# Patient Record
Sex: FEMALE | Race: BLACK OR AFRICAN AMERICAN | NOT HISPANIC OR LATINO | URBAN - METROPOLITAN AREA
[De-identification: names, ages, dates, MRNs, and addresses within clinical notes are randomized per-mention and may not be internally consistent; named-entity substitution may affect disease eponyms.]

---

## 2017-12-14 ENCOUNTER — INPATIENT (INPATIENT)
Facility: HOSPITAL | Age: 52
LOS: 5 days | Discharge: HOME | DRG: 885 | End: 2017-12-20
Attending: STUDENT IN AN ORGANIZED HEALTH CARE EDUCATION/TRAINING PROGRAM | Admitting: STUDENT IN AN ORGANIZED HEALTH CARE EDUCATION/TRAINING PROGRAM
Payer: MEDICAID

## 2017-12-14 RX ORDER — METFORMIN HYDROCHLORIDE 850 MG/1
500 TABLET ORAL
Qty: 0 | Refills: 0 | Status: DISCONTINUED | OUTPATIENT
Start: 2017-12-14 | End: 2017-12-20

## 2017-12-14 RX ORDER — HALOPERIDOL DECANOATE 100 MG/ML
5 INJECTION INTRAMUSCULAR EVERY 6 HOURS
Qty: 0 | Refills: 0 | Status: DISCONTINUED | OUTPATIENT
Start: 2017-12-14 | End: 2017-12-20

## 2017-12-14 RX ORDER — ESCITALOPRAM OXALATE 10 MG/1
10 TABLET, FILM COATED ORAL DAILY
Qty: 0 | Refills: 0 | Status: DISCONTINUED | OUTPATIENT
Start: 2017-12-14 | End: 2017-12-20

## 2017-12-14 RX ORDER — DIPHENHYDRAMINE HCL 50 MG
50 CAPSULE ORAL EVERY 6 HOURS
Qty: 0 | Refills: 0 | Status: DISCONTINUED | OUTPATIENT
Start: 2017-12-14 | End: 2017-12-20

## 2017-12-14 RX ORDER — HYDROCHLOROTHIAZIDE 25 MG
12.5 TABLET ORAL DAILY
Qty: 0 | Refills: 0 | Status: DISCONTINUED | OUTPATIENT
Start: 2017-12-14 | End: 2017-12-20

## 2017-12-14 NOTE — ED BEHAVIORAL HEALTH ASSESSMENT NOTE - SUMMARY
15 year old girl with no clear past psychiatric diagnosis and history of non-suicidal self-injury brought in by mother to [facility] ER for “panic attacks” of increasing severity and frequency which started around 1 month ago. Patient is currently mildly depressed, not manic/psychotic/suicidal/homicidal. Based on her account, the NSSI behavior is the result of her incapacity to regulate her emotions at times of intense stress and not secondary to suicidal ideation/intent.  Patient is currently not an imminent threat to self/others and does not meet the criteria for inpatient psychiatric admission.  She warrants a diagnosis of panic disorder and possibly adjustment disorder with depressed mood.  She will be given outpatient psychiatric referrals for followup. Mother was advised to return to ER if patient develops suicidal thoughts, becomes severely depressed or engages in self-injurious behavior with unclear intent.

## 2017-12-14 NOTE — ED BEHAVIORAL HEALTH ASSESSMENT NOTE - OTHER PAST PSYCHIATRIC HISTORY (INCLUDE DETAILS REGARDING ONSET, COURSE OF ILLNESS, INPATIENT/OUTPATIENT TREATMENT)
Patient used to see a therapist on a regular basis for around 1 year until about a year ago. She started seeing the therapist after her mother discovered her NSSI behavior. It is unclear why therapy was discontinued. Details of therapist not clearly known (a “ at [facility]”).No history of being evaluated/treated by a psychiatrist or psychiatric hospitalization.

## 2017-12-14 NOTE — ED BEHAVIORAL HEALTH ASSESSMENT NOTE - HPI (INCLUDE ILLNESS QUALITY, SEVERITY, DURATION, TIMING, CONTEXT, MODIFYING FACTORS, ASSOCIATED SIGNS AND SYMPTOMS)
15 year old girl with no clear past psychiatric diagnosis and history of non-suicidal self-injury brought in by mother to [facility] ER for “panic attacks”. Writer evaluated patient and spoke with mother.     Patient has been having episodes of discomfort lasting 10-15mins during which she becomes sweaty, breathless  and severely anxious with the feeling that she might die on some occasions.She had approximately 4 episodes today and 3 yesterday. These attacks were first noticed around 1 month ago. Per patient and mother, they have increased in terms of frequency and severity over the last few weeks.Mother also reported that she has social anxiety to a degree and fear of public speaking.During such instances she has experienced panic symptoms in the past.Some of her panic attacks were witnessed by sister/mother.    Patient also endorsed that her mood is depressed but not to the point of feeling suicidal. She currently does not have other neurovegetative symptoms of depression. She denied manic episodes in the past, denied auditory/visual hallucinations. No delusions verbalized during today’s encounter. No history of sexual/physical trauma reported.  Patient has a history of engaging in non-suicidal self-injury  which was first reported around 4 years ago. She tends to cut in areas that are not easily visible such as her hips/thighs. Also reported having inflicted wounds to forearm but there were no discernible scars. She reportedly cuts when she feels overwhelmed/angry without suicidal intent but as a method of relieving her frustration. Per mother, there is a learned component to this behavior as her elder sister (18y) also engages in NSSI.     As of now she denies suicidal ideation (/homicidal ideation), intent or plan. She reported having passive SI intermittently in the past (1week ago), but  that feeling has passed. She stated that she wouldn’t attempt suicide as that have an immense emotional impact on her loved ones. Other stated reasons for living include wanting to spend time with friends and “hang out”.     Patient also reported significant psychosocial stress at home (corroborated by mother) : grandmother who lives in the same house in an alcoholic  who needs to be looked after by patient, sister and mother. Patient reported feeling very upset about the fact that she has had to tend to her grandmother’s needs when she drinks, such as helping her stand up when she falls in an intoxicated state. Patient also reported considerable stress associated with the fact that she has to “play messenger” between her  parents.

## 2017-12-14 NOTE — ED BEHAVIORAL HEALTH ASSESSMENT NOTE - SUICIDE PROTECTIVE FACTORS
Fear of death or dying due to pain/suffering/Engaged in work or school/Identifies reasons for living/Future oriented/Responsibility to family and others/Supportive social network or family

## 2017-12-14 NOTE — ED BEHAVIORAL HEALTH ASSESSMENT NOTE - DESCRIPTION
Uneventful None Lives with grandmother, mother and 18y sister. Parents . Father lives in [facility]. Patient visits him at times. Grandmother has alcohol use disorder and this has put considerable stress on the entire family. Patient is in 10th grade. Per mother, she was on the honor’s roll for the last semester. Patient reports that she has at least 5 good friends with whom she enjoys outings. She is currently not in a relationship.

## 2017-12-14 NOTE — ED BEHAVIORAL HEALTH ASSESSMENT NOTE - DETAILS
Intermittent passive suicidal ideation in the past 18y old sister has a diagnosis of ADHD and depression for which she takes medications 75y old grandmother has alcohol use disorder
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
## 2017-12-15 VITALS
SYSTOLIC BLOOD PRESSURE: 119 MMHG | TEMPERATURE: 98 F | HEART RATE: 84 BPM | RESPIRATION RATE: 18 BRPM | DIASTOLIC BLOOD PRESSURE: 65 MMHG

## 2017-12-15 DIAGNOSIS — E11.9 TYPE 2 DIABETES MELLITUS WITHOUT COMPLICATIONS: ICD-10-CM

## 2017-12-15 DIAGNOSIS — F33.9 MAJOR DEPRESSIVE DISORDER, RECURRENT, UNSPECIFIED: ICD-10-CM

## 2017-12-15 DIAGNOSIS — I10 ESSENTIAL (PRIMARY) HYPERTENSION: ICD-10-CM

## 2017-12-15 PROCEDURE — 99223 1ST HOSP IP/OBS HIGH 75: CPT

## 2017-12-15 RX ORDER — RISPERIDONE 4 MG/1
0.5 TABLET ORAL
Qty: 0 | Refills: 0 | Status: DISCONTINUED | OUTPATIENT
Start: 2017-12-15 | End: 2017-12-18

## 2017-12-15 RX ORDER — INSULIN LISPRO 100/ML
VIAL (ML) SUBCUTANEOUS
Qty: 0 | Refills: 0 | Status: DISCONTINUED | OUTPATIENT
Start: 2017-12-15 | End: 2017-12-20

## 2017-12-15 RX ADMIN — RISPERIDONE 0.5 MILLIGRAM(S): 4 TABLET ORAL at 21:44

## 2017-12-15 RX ADMIN — METFORMIN HYDROCHLORIDE 500 MILLIGRAM(S): 850 TABLET ORAL at 08:06

## 2017-12-15 RX ADMIN — METFORMIN HYDROCHLORIDE 500 MILLIGRAM(S): 850 TABLET ORAL at 17:27

## 2017-12-15 RX ADMIN — ESCITALOPRAM OXALATE 10 MILLIGRAM(S): 10 TABLET, FILM COATED ORAL at 11:49

## 2017-12-15 RX ADMIN — Medication 12.5 MILLIGRAM(S): at 11:49

## 2017-12-15 NOTE — BEHAVIORAL HEALTH ASSESSMENT NOTE - NSBHCHARTREVIEWVS_PSY_A_CORE FT
Vital Signs Last 24 Hrs  T(C): 36.8 (15 Dec 2017 09:00), Max: 36.9 (15 Dec 2017 06:26)  T(F): 98.3 (15 Dec 2017 09:00), Max: 98.5 (15 Dec 2017 06:26)  HR: 101 (15 Dec 2017 09:00) (84 - 101)  BP: 140/86 (15 Dec 2017 09:00) (119/65 - 140/86)  BP(mean): --  RR: 18 (15 Dec 2017 09:00) (18 - 18)  SpO2: --

## 2017-12-15 NOTE — BEHAVIORAL HEALTH ASSESSMENT NOTE - AXIS III
NIDDM, Hypertension, hx of fibroid, hx of hysterectomy 2011 NIDDM, Hypertension, hx of fibroid, hx of hysterectomy 2011, cholecystectomy

## 2017-12-15 NOTE — BEHAVIORAL HEALTH ASSESSMENT NOTE - NSBHMEDSOTHERFT_PSY_A_CORE
Metformin 500mg BID with meals  HCTZ 12.5mg daily  Lexapro 10mg daily -started at [facility]

## 2017-12-15 NOTE — BEHAVIORAL HEALTH ASSESSMENT NOTE - PROBLEM SELECTOR PLAN 1
Continue Lexapro 10mg  Begin low dose Risperdal 0.5mg BID  labs ordered for 12/16 am: CBC w diff, CMP, a1c, lipid panel, thyroid panel, folate serum, vitamin b12, vitamin d  I/G/M therapy

## 2017-12-15 NOTE — BEHAVIORAL HEALTH ASSESSMENT NOTE - HPI (INCLUDE ILLNESS QUALITY, SEVERITY, DURATION, TIMING, CONTEXT, MODIFYING FACTORS, ASSOCIATED SIGNS AND SYMPTOMS)
This is a 52 year old [place] American , unemployed female receiving public assistance. She currently resides with her adult daughter and 10 month old grandson in [place]. Patient denies formal psychiatric history, but has 2 prior suicide attempts via poisoning and OD. She has never been formally diagnosed, has no prior inpatient hospitalizations and has never seen a psychiatrist or therapist. Patient was transferred from [facility] to [facility]. She presented via EMS after they were activated by her PCP at [facility] after she endorsed depression and SI during a routine screening. Patient reports receiving PO PRN medications after ‘ I spazed on the doctor who was lying to me at [facility]’ after being told several days after being in the [facility] that she was going to be admitted to a psychiatric unit.  Patient states that she was at a routine visit to do lab work regarding her DM on Monday, but when asked about her depression she endorsed feeling depressed and suicidal which she reports usually feeling. She states that she was shocked when the police were alerted and she was forced to go to the hospital.   Patient reports that she usually feels depressed and suicidal, but finds that it worsens during September to December. September is the anniversary of her mother’s death in 2003. She reports long standing history of depression as a child after discovering that out of her 7 siblings she was the only would who had a different father. She endorses guilt feeling as though she ‘ruined’ her parents marriage. She recalls as a child frequently making suicidal statements due to looking and feeling different from her siblings and first attempted suicide at age 10/11 after drinking poison. She later attempted suicide as a young adult via OD on medications. She states that she currently feels like a burden to her family, “I feel less than.” She cites that she is now on public assistance after pursuing her bachelors at [employer] she was unable to move forward with getting her masters for Social work. “You can never get ahead.”  She endorses symptoms of depression including poor sleep, stating that she does not sleep at night, only sleeps for 2 hours a day, with mini 15-20 minute naps, increase in eating of junk food and incessant crying.   She reports since childhood having auditory hallucinations hearing voices saying things such as “It would have been better if you weren’t born, things would be better for your family.” The voices have told her at times to jump in front of a car or a train. At times she also hears her mother humming certain [place] songs. She last had a/h several days ago. She reports a remote history of v/h seeing a dead figure running from her when she was a child. No paranoia. Protective factors against suicide is her daughter and grandson “I have to prep her to be more self-sufficient’   In 2011 she had several surgeries related to having her fibroids removed, subsequently having 5 bacterial infections and later a hysterectomy. She reports noticing a dramatic change in mood, becoming more confrontational, increase in anger and irritability, disorganization and confusion. She states that she stopped taking her metformin in February due to low a1c. She endorses history of impulsively buying things such as ornaments and magnets and lately has begun making them herself. She has also been cleaning more, but finding that she is becoming focused on cleaning one thing and being unable to move until it she believes that it is sufficiently clean-it never is. She reports a history of OCD like tendencies.   No drug/substance use history.

## 2017-12-15 NOTE — BEHAVIORAL HEALTH ASSESSMENT NOTE - NSBHADMITCOUNSEL_PSY_A_CORE
risks and benefits of treatment options/instructions for management, treatment and follow up/importance of adherence to chosen treatment/client/family/caregiver education

## 2017-12-15 NOTE — BEHAVIORAL HEALTH ASSESSMENT NOTE - DIFFERENTIAL
MDD severe recurrent with psychotic features  Schizoaffective disorder depressed   r/o BAD current episode depressed

## 2017-12-15 NOTE — BEHAVIORAL HEALTH ASSESSMENT NOTE - SUMMARY
This is a 52 year old [place] American , unemployed female receiving public assistance. She currently resides with her adult daughter and 10 month old grandson in [place]. Patient denies formal psychiatric history, but has 2 prior suicide attempts via poisoning and OD. She has never been formally diagnosed, has no prior inpatient hospitalizations and has never seen a psychiatrist or therapist. Patient was transferred from [facility] to [facility]. She presented via EMS after they were activated by her PCP at [facility] after she endorsed depression and SI during a routine screening. Patient reports receiving PO PRN medications after ‘ I spazed on the doctor who was lying to me at [facility]’ after being told several days after being in the [facility] that she was going to be admitted to a psychiatric unit.    Patient reports since starting medication she has not had a/h in several days.

## 2017-12-15 NOTE — BEHAVIORAL HEALTH ASSESSMENT NOTE - NSBHSUICRISKFACTOR_PSY_A_CORE
Anhedonia/Family history of suicide/Global insomnia/Mood episode/Command hallucinations to hurt self
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
## 2017-12-15 NOTE — BEHAVIORAL HEALTH ASSESSMENT NOTE - RISK ASSESSMENT
Static: Mental illness, prior suicide attempts  Modifiable: Access to treatment/medications  Protective: family, residential stability

## 2017-12-16 LAB
ALBUMIN SERPL ELPH-MCNC: 4.1 G/DL — SIGNIFICANT CHANGE UP (ref 3.3–5)
ALP SERPL-CCNC: 105 U/L — SIGNIFICANT CHANGE UP (ref 40–120)
ALT FLD-CCNC: 34 U/L — SIGNIFICANT CHANGE UP (ref 10–45)
ANION GAP SERPL CALC-SCNC: 16 MMOL/L — SIGNIFICANT CHANGE UP (ref 5–17)
AST SERPL-CCNC: 41 U/L — HIGH (ref 10–40)
BASOPHILS NFR BLD AUTO: 0.5 % — SIGNIFICANT CHANGE UP (ref 0–2)
BILIRUB SERPL-MCNC: 0.4 MG/DL — SIGNIFICANT CHANGE UP (ref 0.2–1.2)
BUN SERPL-MCNC: 12 MG/DL — SIGNIFICANT CHANGE UP (ref 7–23)
CALCIUM SERPL-MCNC: 9.4 MG/DL — SIGNIFICANT CHANGE UP (ref 8.4–10.5)
CHLORIDE SERPL-SCNC: 96 MMOL/L — SIGNIFICANT CHANGE UP (ref 96–108)
CHOLEST SERPL-MCNC: 153 MG/DL — SIGNIFICANT CHANGE UP (ref 10–199)
CO2 SERPL-SCNC: 24 MMOL/L — SIGNIFICANT CHANGE UP (ref 22–31)
CREAT SERPL-MCNC: 0.85 MG/DL — SIGNIFICANT CHANGE UP (ref 0.5–1.3)
EOSINOPHIL NFR BLD AUTO: 1.7 % — SIGNIFICANT CHANGE UP (ref 0–6)
FOLATE SERPL-MCNC: 13.4 NG/ML — SIGNIFICANT CHANGE UP (ref 4.8–24.2)
GLUCOSE SERPL-MCNC: 245 MG/DL — HIGH (ref 70–99)
HBA1C BLD-MCNC: 6.7 % — HIGH (ref 4–5.6)
HCT VFR BLD CALC: 41.3 % — SIGNIFICANT CHANGE UP (ref 34.5–45)
HDLC SERPL-MCNC: 34 MG/DL — LOW (ref 40–125)
HGB BLD-MCNC: 13.8 G/DL — SIGNIFICANT CHANGE UP (ref 11.5–15.5)
LIPID PNL WITH DIRECT LDL SERPL: 96 MG/DL — SIGNIFICANT CHANGE UP
LYMPHOCYTES # BLD AUTO: 46.9 % — HIGH (ref 13–44)
MCHC RBC-ENTMCNC: 28.2 PG — SIGNIFICANT CHANGE UP (ref 27–34)
MCHC RBC-ENTMCNC: 33.4 G/DL — SIGNIFICANT CHANGE UP (ref 32–36)
MCV RBC AUTO: 84.5 FL — SIGNIFICANT CHANGE UP (ref 80–100)
MONOCYTES NFR BLD AUTO: 5.5 % — SIGNIFICANT CHANGE UP (ref 2–14)
NEUTROPHILS NFR BLD AUTO: 45.4 % — SIGNIFICANT CHANGE UP (ref 43–77)
PLATELET # BLD AUTO: 276 K/UL — SIGNIFICANT CHANGE UP (ref 150–400)
POTASSIUM SERPL-MCNC: 3.3 MMOL/L — LOW (ref 3.5–5.3)
POTASSIUM SERPL-SCNC: 3.3 MMOL/L — LOW (ref 3.5–5.3)
PROT SERPL-MCNC: 7.9 G/DL — SIGNIFICANT CHANGE UP (ref 6–8.3)
RBC # BLD: 4.89 M/UL — SIGNIFICANT CHANGE UP (ref 3.8–5.2)
RBC # FLD: 14.2 % — SIGNIFICANT CHANGE UP (ref 10.3–16.9)
SODIUM SERPL-SCNC: 136 MMOL/L — SIGNIFICANT CHANGE UP (ref 135–145)
T3 SERPL-MCNC: 90 NG/DL — SIGNIFICANT CHANGE UP (ref 80–200)
T4 AB SER-ACNC: 7.82 UG/DL — SIGNIFICANT CHANGE UP (ref 3.17–11.72)
TOTAL CHOLESTEROL/HDL RATIO MEASUREMENT: 4.5 RATIO — SIGNIFICANT CHANGE UP (ref 3.3–7.1)
TRIGL SERPL-MCNC: 114 MG/DL — SIGNIFICANT CHANGE UP (ref 10–149)
TSH SERPL-MCNC: 1.4 UIU/ML — SIGNIFICANT CHANGE UP (ref 0.35–4.94)
VIT B12 SERPL-MCNC: 256 PG/ML — SIGNIFICANT CHANGE UP (ref 243–894)
WBC # BLD: 6.4 K/UL — SIGNIFICANT CHANGE UP (ref 3.8–10.5)
WBC # FLD AUTO: 6.4 K/UL — SIGNIFICANT CHANGE UP (ref 3.8–10.5)

## 2017-12-16 PROCEDURE — 99231 SBSQ HOSP IP/OBS SF/LOW 25: CPT

## 2017-12-16 RX ADMIN — Medication 12.5 MILLIGRAM(S): at 10:53

## 2017-12-16 RX ADMIN — METFORMIN HYDROCHLORIDE 500 MILLIGRAM(S): 850 TABLET ORAL at 10:53

## 2017-12-16 RX ADMIN — RISPERIDONE 0.5 MILLIGRAM(S): 4 TABLET ORAL at 10:53

## 2017-12-16 RX ADMIN — METFORMIN HYDROCHLORIDE 500 MILLIGRAM(S): 850 TABLET ORAL at 17:32

## 2017-12-16 RX ADMIN — ESCITALOPRAM OXALATE 10 MILLIGRAM(S): 10 TABLET, FILM COATED ORAL at 10:53

## 2017-12-16 RX ADMIN — RISPERIDONE 0.5 MILLIGRAM(S): 4 TABLET ORAL at 21:27

## 2017-12-16 NOTE — PROGRESS NOTE BEHAVIORAL HEALTH - NSBHFUPINTERVALHXFT_PSY_A_CORE
No reported beh issues or issues overnight. Pt reported feeling "better" with improved sleep, intact appetite. Reported that no longer experiencing SI/intent or plan. Denied HI/AVH. Denied physical complaints. Requested DM dietician/educator.

## 2017-12-17 LAB — 24R-OH-CALCIDIOL SERPL-MCNC: 9.4 NG/ML — LOW (ref 30–80)

## 2017-12-17 RX ADMIN — Medication 12.5 MILLIGRAM(S): at 10:02

## 2017-12-17 RX ADMIN — METFORMIN HYDROCHLORIDE 500 MILLIGRAM(S): 850 TABLET ORAL at 17:03

## 2017-12-17 RX ADMIN — RISPERIDONE 0.5 MILLIGRAM(S): 4 TABLET ORAL at 21:26

## 2017-12-17 RX ADMIN — METFORMIN HYDROCHLORIDE 500 MILLIGRAM(S): 850 TABLET ORAL at 07:06

## 2017-12-17 RX ADMIN — RISPERIDONE 0.5 MILLIGRAM(S): 4 TABLET ORAL at 10:02

## 2017-12-17 RX ADMIN — ESCITALOPRAM OXALATE 10 MILLIGRAM(S): 10 TABLET, FILM COATED ORAL at 10:02

## 2017-12-18 PROCEDURE — 99232 SBSQ HOSP IP/OBS MODERATE 35: CPT

## 2017-12-18 RX ORDER — RISPERIDONE 4 MG/1
0.5 TABLET ORAL DAILY
Qty: 0 | Refills: 0 | Status: DISCONTINUED | OUTPATIENT
Start: 2017-12-18 | End: 2017-12-19

## 2017-12-18 RX ORDER — ERGOCALCIFEROL 1.25 MG/1
50000 CAPSULE ORAL
Qty: 0 | Refills: 0 | Status: DISCONTINUED | OUTPATIENT
Start: 2017-12-18 | End: 2017-12-20

## 2017-12-18 RX ORDER — RISPERIDONE 4 MG/1
1 TABLET ORAL AT BEDTIME
Qty: 0 | Refills: 0 | Status: DISCONTINUED | OUTPATIENT
Start: 2017-12-18 | End: 2017-12-19

## 2017-12-18 RX ADMIN — ESCITALOPRAM OXALATE 10 MILLIGRAM(S): 10 TABLET, FILM COATED ORAL at 11:40

## 2017-12-18 RX ADMIN — METFORMIN HYDROCHLORIDE 500 MILLIGRAM(S): 850 TABLET ORAL at 08:00

## 2017-12-18 RX ADMIN — RISPERIDONE 0.5 MILLIGRAM(S): 4 TABLET ORAL at 11:41

## 2017-12-18 RX ADMIN — METFORMIN HYDROCHLORIDE 500 MILLIGRAM(S): 850 TABLET ORAL at 16:57

## 2017-12-18 RX ADMIN — RISPERIDONE 1 MILLIGRAM(S): 4 TABLET ORAL at 21:53

## 2017-12-18 RX ADMIN — Medication 12.5 MILLIGRAM(S): at 11:41

## 2017-12-18 NOTE — PROGRESS NOTE BEHAVIORAL HEALTH - OTHER
No eye contact odd, body is positioned away from writer better Reports long standing hx of a/h, none currently

## 2017-12-18 NOTE — PROGRESS NOTE BEHAVIORAL HEALTH - NSBHFUPINTERVALHXFT_PSY_A_CORE
Patient stated that she had a fair weekend, she went to the majority of groups which she enjoyed and found to be therapeutic. She also was visited by her daughter and a friend. She reports improvement in mood, denying a/v hallucinations or paranoia. No si/hi. She feels safe on the unit. She states that she has come to terms that she has a mental illness and would like to continue treatment in the outpatient setting. Sleep has vastly improved she is now sleeping up to 6 hours and appetite is fair. No medical issues.    Per staff report she is pleasant, social, going to groups, superficial and oddly related.

## 2017-12-19 PROCEDURE — 99232 SBSQ HOSP IP/OBS MODERATE 35: CPT

## 2017-12-19 RX ORDER — RISPERIDONE 4 MG/1
1.5 TABLET ORAL AT BEDTIME
Qty: 0 | Refills: 0 | Status: DISCONTINUED | OUTPATIENT
Start: 2017-12-19 | End: 2017-12-20

## 2017-12-19 RX ORDER — RISPERIDONE 4 MG/1
1 TABLET ORAL DAILY
Qty: 0 | Refills: 0 | Status: DISCONTINUED | OUTPATIENT
Start: 2017-12-19 | End: 2017-12-20

## 2017-12-19 RX ORDER — RISPERIDONE 4 MG/1
1 TABLET ORAL
Qty: 0 | Refills: 0 | Status: DISCONTINUED | OUTPATIENT
Start: 2017-12-19 | End: 2017-12-19

## 2017-12-19 RX ADMIN — ERGOCALCIFEROL 50000 UNIT(S): 1.25 CAPSULE ORAL at 10:24

## 2017-12-19 RX ADMIN — METFORMIN HYDROCHLORIDE 500 MILLIGRAM(S): 850 TABLET ORAL at 17:11

## 2017-12-19 RX ADMIN — ESCITALOPRAM OXALATE 10 MILLIGRAM(S): 10 TABLET, FILM COATED ORAL at 10:25

## 2017-12-19 RX ADMIN — RISPERIDONE 0.5 MILLIGRAM(S): 4 TABLET ORAL at 10:25

## 2017-12-19 RX ADMIN — METFORMIN HYDROCHLORIDE 500 MILLIGRAM(S): 850 TABLET ORAL at 07:21

## 2017-12-19 RX ADMIN — RISPERIDONE 1.5 MILLIGRAM(S): 4 TABLET ORAL at 21:26

## 2017-12-19 NOTE — PROGRESS NOTE BEHAVIORAL HEALTH - CASE SUMMARY
Pt is calm, cooperative, pleasant.  She notes improving mood.  Denies si/hi/ah/vh.  Does not appear to be responding to internal stimuli.  Compliant with meds, no side effects.  Future-oriented.  Social with peers, attending groups. Euthymic affect. Cont tx as above.  Discharge planning.

## 2017-12-19 NOTE — PROGRESS NOTE BEHAVIORAL HEALTH - OTHER
eye contact is very minimal, but improving odd, body is positioned away from writer better appears giddy Reports long standing hx of a/h, none currently

## 2017-12-19 NOTE — PROGRESS NOTE BEHAVIORAL HEALTH - NSBHFUPINTERVALHXFT_PSY_A_CORE
Patient reports an improvement in mood, stating that she is happy to find that there are other people like her who sometimes feel depressed and sometimes hear voices. She states that she had a visit earlier this morning from her insurance  who will be working with her in the community with her appointments, jobs etc which she is very excited about. She is future oriented and discharge focused. No si/hi, no a/v hallucinations or paranoia. Is visible on the unit, seen attending a variety of groups, appropriately interacting with peers and staff. Sleep and appetite are good. No physical/medical complaints.    Per staff report, patient has full range of affect, superficial and pleasant

## 2017-12-20 VITALS
DIASTOLIC BLOOD PRESSURE: 87 MMHG | HEART RATE: 98 BPM | RESPIRATION RATE: 16 BRPM | TEMPERATURE: 98 F | SYSTOLIC BLOOD PRESSURE: 142 MMHG

## 2017-12-20 PROCEDURE — 82746 ASSAY OF FOLIC ACID SERUM: CPT

## 2017-12-20 PROCEDURE — 83036 HEMOGLOBIN GLYCOSYLATED A1C: CPT

## 2017-12-20 PROCEDURE — 82306 VITAMIN D 25 HYDROXY: CPT

## 2017-12-20 PROCEDURE — 84480 ASSAY TRIIODOTHYRONINE (T3): CPT

## 2017-12-20 PROCEDURE — 82607 VITAMIN B-12: CPT

## 2017-12-20 PROCEDURE — 84443 ASSAY THYROID STIM HORMONE: CPT

## 2017-12-20 PROCEDURE — 84436 ASSAY OF TOTAL THYROXINE: CPT

## 2017-12-20 PROCEDURE — 85025 COMPLETE CBC W/AUTO DIFF WBC: CPT

## 2017-12-20 PROCEDURE — 99238 HOSP IP/OBS DSCHRG MGMT 30/<: CPT

## 2017-12-20 PROCEDURE — 80061 LIPID PANEL: CPT

## 2017-12-20 PROCEDURE — 80053 COMPREHEN METABOLIC PANEL: CPT

## 2017-12-20 PROCEDURE — 82962 GLUCOSE BLOOD TEST: CPT

## 2017-12-20 PROCEDURE — 36415 COLL VENOUS BLD VENIPUNCTURE: CPT

## 2017-12-20 RX ORDER — RISPERIDONE 4 MG/1
1 TABLET ORAL
Qty: 14 | Refills: 0 | OUTPATIENT
Start: 2017-12-20 | End: 2018-01-02

## 2017-12-20 RX ORDER — ESCITALOPRAM OXALATE 10 MG/1
1 TABLET, FILM COATED ORAL
Qty: 14 | Refills: 0 | OUTPATIENT
Start: 2017-12-20 | End: 2018-01-02

## 2017-12-20 RX ORDER — RISPERIDONE 4 MG/1
3 TABLET ORAL
Qty: 0 | Refills: 0 | COMMUNITY
Start: 2017-12-20

## 2017-12-20 RX ORDER — ERGOCALCIFEROL 1.25 MG/1
1 CAPSULE ORAL
Qty: 4 | Refills: 0 | OUTPATIENT
Start: 2017-12-20 | End: 2018-01-18

## 2017-12-20 RX ORDER — RISPERIDONE 4 MG/1
3 TABLET ORAL
Qty: 42 | Refills: 0 | OUTPATIENT
Start: 2017-12-20 | End: 2018-01-02

## 2017-12-20 RX ORDER — ESCITALOPRAM OXALATE 10 MG/1
1 TABLET, FILM COATED ORAL
Qty: 0 | Refills: 0 | COMMUNITY
Start: 2017-12-20

## 2017-12-20 RX ORDER — METFORMIN HYDROCHLORIDE 850 MG/1
1 TABLET ORAL
Qty: 28 | Refills: 0 | OUTPATIENT
Start: 2017-12-20 | End: 2018-01-02

## 2017-12-20 RX ORDER — METFORMIN HYDROCHLORIDE 850 MG/1
1 TABLET ORAL
Qty: 0 | Refills: 0 | COMMUNITY
Start: 2017-12-20

## 2017-12-20 RX ORDER — RISPERIDONE 4 MG/1
1 TABLET ORAL
Qty: 0 | Refills: 0 | COMMUNITY
Start: 2017-12-20

## 2017-12-20 RX ADMIN — Medication 12.5 MILLIGRAM(S): at 10:41

## 2017-12-20 RX ADMIN — METFORMIN HYDROCHLORIDE 500 MILLIGRAM(S): 850 TABLET ORAL at 08:01

## 2017-12-20 RX ADMIN — ESCITALOPRAM OXALATE 10 MILLIGRAM(S): 10 TABLET, FILM COATED ORAL at 10:40

## 2017-12-20 RX ADMIN — RISPERIDONE 1 MILLIGRAM(S): 4 TABLET ORAL at 10:41

## 2017-12-20 NOTE — PROGRESS NOTE BEHAVIORAL HEALTH - PROBLEM SELECTOR PLAN 2
continue metformin, insulin SS  request dietician/educator consult during weekday per pt's request
continue metformin, insulin SS  request dietician/educator consult during weekday per pt's request
continue metformin, insulin SS
continue metformin, insulin SS

## 2017-12-20 NOTE — PROGRESS NOTE BEHAVIORAL HEALTH - THOUGHT CONTENT
Preoccupations/Ruminations
Ruminations/Preoccupations
Preoccupations/Ruminations
Preoccupations/Ruminations

## 2017-12-20 NOTE — PROGRESS NOTE BEHAVIORAL HEALTH - PROBLEM SELECTOR PLAN 1
Continue primary team's regimen: of lexapro 10mg qdaily, risperdal 0.5mg q12hr - titrate as per primary team
lexapro 10mg qdaily,  risperdal 0.5mg daily and 1mg QHS
lexapro 10mg qdaily,  risperdal 1mg daily and 1.5mg QHS
lexapro 10mg qdaily,  risperdal 1mg daily and 1.5mg QHS

## 2017-12-20 NOTE — PROGRESS NOTE BEHAVIORAL HEALTH - NSBHCHARTREVIEWVS_PSY_A_CORE FT
Vital Signs Last 24 Hrs  T(C): 36.6 (20 Dec 2017 09:05), Max: 36.8 (19 Dec 2017 17:00)  T(F): 97.9 (20 Dec 2017 09:05), Max: 98.3 (19 Dec 2017 17:00)  HR: 98 (20 Dec 2017 09:05) (70 - 98)  BP: 142/87 (20 Dec 2017 09:05) (121/68 - 142/87)  BP(mean): --  RR: 16 (20 Dec 2017 09:05) (16 - 18)  SpO2: --  Weekly weight 12/19/17 of 258.9
Vital Signs Last 24 Hrs  T(C): 36.7 (18 Dec 2017 09:06), Max: 36.8 (17 Dec 2017 17:00)  T(F): 98.1 (18 Dec 2017 09:06), Max: 98.3 (17 Dec 2017 17:00)  HR: 116 (18 Dec 2017 09:06) (82 - 116)  BP: 142/83 (18 Dec 2017 09:06) (133/76 - 142/83)  BP(mean): --  RR: 20 (18 Dec 2017 09:06) (18 - 20)  SpO2: --
Vital Signs Last 24 Hrs  T(C): 36.6 (19 Dec 2017 08:53), Max: 36.8 (18 Dec 2017 17:09)  T(F): 97.9 (19 Dec 2017 08:53), Max: 98.3 (18 Dec 2017 17:09)  HR: 79 (19 Dec 2017 08:53) (79 - 86)  BP: 98/75 (19 Dec 2017 08:53) (98/75 - 128/83)  BP(mean): --  RR: 20 (19 Dec 2017 08:53) (18 - 20)  SpO2: --  Weekly weight 12/19/17 of
WNL inc FS

## 2017-12-20 NOTE — PROGRESS NOTE BEHAVIORAL HEALTH - THOUGHT PROCESS
Linear
Overinclusive/Linear/Normal reasoning
Overinclusive/Tangential/Normal reasoning
Overinclusive/Tangential/Normal reasoning

## 2017-12-20 NOTE — PROGRESS NOTE BEHAVIORAL HEALTH - PRIMARY DX
Major depressive disorder, recurrent episode with mood-congruent psychotic features
Major depressive disorder, recurrent episode with mood-congruent psychotic features

## 2017-12-20 NOTE — PROGRESS NOTE BEHAVIORAL HEALTH - AXIS III
NIDDM, Hypertension, hx of fibroid, hx of hysterectomy 2011, cholecystectomy

## 2017-12-20 NOTE — PROGRESS NOTE BEHAVIORAL HEALTH - PROBLEM SELECTOR PROBLEM 1
Major depressive disorder, recurrent episode with mood-congruent psychotic features

## 2017-12-20 NOTE — PROGRESS NOTE BEHAVIORAL HEALTH - RISK ASSESSMENT
Static: Mental illness, prior suicide attempts  Risk factors: no access to mental health treatment  Protective: residential stability, family support
Static: Mental illness, prior suicide attempts  Risk factors: no access to mental health treatment  Protective: residential stability, family support

## 2017-12-20 NOTE — PROGRESS NOTE BEHAVIORAL HEALTH - NSBHFUPINTERVALHXFT_PSY_A_CORE
Patient continues to report an improvement in mood, denying any symptoms of depression and no thoughts of suicide, death or self-harm. She is future oriented, discharge focused and motivated for treatment. She reports that sleep continues to be good and appetite is fair. No a/v hallucinations or paranoia. She is visible on the unit, frequently attending groups and appropriately interacting with peers and staff. No acute medical concerns. Patient continues to report an improvement in mood, denying any symptoms of depression and no thoughts of suicide, death or self-harm. She is future oriented, discharge focused and motivated for treatment. She reports that sleep continues to be good and appetite is fair. No a/v hallucinations or paranoia. She is visible on the unit, frequently attending groups and appropriately interacting with peers and staff. No acute medical concerns.    Writer spoke with pts daughter Mechelle [phone], who reported feeling that mother was much improved, was looking forward to having her back home and had no concerns of her safety

## 2017-12-20 NOTE — PROGRESS NOTE BEHAVIORAL HEALTH - NSBHADMITCOUNSEL_PSY_A_CORE
risks and benefits of treatment options/diagnostic results/impressions and/or recommended studies/importance of adherence to chosen treatment/client/family/caregiver education/risk factor reduction/instructions for management, treatment and follow up
importance of adherence to chosen treatment/diagnostic results/impressions and/or recommended studies/risks and benefits of treatment options/instructions for management, treatment and follow up/risk factor reduction/client/family/caregiver education
importance of adherence to chosen treatment/risk factor reduction/diagnostic results/impressions and/or recommended studies/risks and benefits of treatment options/instructions for management, treatment and follow up/client/family/caregiver education

## 2017-12-20 NOTE — PROGRESS NOTE BEHAVIORAL HEALTH - NS ED BHA MSE GENERAL APPEARANCE
Well developed/No deformities present
No deformities present/Well developed
Well developed/No deformities present
Well developed/No deformities present

## 2017-12-28 DIAGNOSIS — E55.9 VITAMIN D DEFICIENCY, UNSPECIFIED: ICD-10-CM

## 2017-12-28 DIAGNOSIS — F33.3 MAJOR DEPRESSIVE DISORDER, RECURRENT, SEVERE WITH PSYCHOTIC SYMPTOMS: ICD-10-CM

## 2017-12-28 DIAGNOSIS — E11.9 TYPE 2 DIABETES MELLITUS WITHOUT COMPLICATIONS: ICD-10-CM

## 2017-12-28 DIAGNOSIS — I10 ESSENTIAL (PRIMARY) HYPERTENSION: ICD-10-CM

## 2017-12-28 DIAGNOSIS — F29 UNSPECIFIED PSYCHOSIS NOT DUE TO A SUBSTANCE OR KNOWN PHYSIOLOGICAL CONDITION: ICD-10-CM

## 2018-09-11 ENCOUNTER — EMERGENCY (EMERGENCY)
Facility: HOSPITAL | Age: 53
LOS: 1 days | Discharge: HOME | End: 2018-09-11
Attending: EMERGENCY MEDICINE | Admitting: EMERGENCY MEDICINE
Payer: MEDICAID

## 2018-09-11 VITALS
DIASTOLIC BLOOD PRESSURE: 84 MMHG | SYSTOLIC BLOOD PRESSURE: 140 MMHG | HEART RATE: 87 BPM | RESPIRATION RATE: 18 BRPM | OXYGEN SATURATION: 99 % | TEMPERATURE: 98 F

## 2018-09-11 VITALS
DIASTOLIC BLOOD PRESSURE: 83 MMHG | OXYGEN SATURATION: 99 % | WEIGHT: 254.63 LBS | TEMPERATURE: 98 F | HEART RATE: 102 BPM | RESPIRATION RATE: 18 BRPM | SYSTOLIC BLOOD PRESSURE: 126 MMHG | HEIGHT: 64 IN

## 2018-09-11 LAB
ALBUMIN SERPL ELPH-MCNC: 4.2 G/DL — SIGNIFICANT CHANGE UP (ref 3.3–5)
ALP SERPL-CCNC: 117 U/L — SIGNIFICANT CHANGE UP (ref 40–120)
ALT FLD-CCNC: 33 U/L — SIGNIFICANT CHANGE UP (ref 10–45)
ANION GAP SERPL CALC-SCNC: 18 MMOL/L — HIGH (ref 5–17)
APPEARANCE UR: ABNORMAL
AST SERPL-CCNC: 39 U/L — SIGNIFICANT CHANGE UP (ref 10–40)
BASOPHILS NFR BLD AUTO: 0.3 % — SIGNIFICANT CHANGE UP (ref 0–2)
BILIRUB SERPL-MCNC: 0.8 MG/DL — SIGNIFICANT CHANGE UP (ref 0.2–1.2)
BILIRUB UR-MCNC: NEGATIVE — SIGNIFICANT CHANGE UP
BUN SERPL-MCNC: 16 MG/DL — SIGNIFICANT CHANGE UP (ref 7–23)
CALCIUM SERPL-MCNC: 10.2 MG/DL — SIGNIFICANT CHANGE UP (ref 8.4–10.5)
CHLORIDE SERPL-SCNC: 102 MMOL/L — SIGNIFICANT CHANGE UP (ref 96–108)
CO2 SERPL-SCNC: 21 MMOL/L — LOW (ref 22–31)
COLOR SPEC: YELLOW — SIGNIFICANT CHANGE UP
CREAT SERPL-MCNC: 1.13 MG/DL — SIGNIFICANT CHANGE UP (ref 0.5–1.3)
DIFF PNL FLD: ABNORMAL
EOSINOPHIL NFR BLD AUTO: 2.6 % — SIGNIFICANT CHANGE UP (ref 0–6)
GLUCOSE SERPL-MCNC: 124 MG/DL — HIGH (ref 70–99)
GLUCOSE UR QL: NEGATIVE — SIGNIFICANT CHANGE UP
HCT VFR BLD CALC: 44.5 % — SIGNIFICANT CHANGE UP (ref 34.5–45)
HGB BLD-MCNC: 15 G/DL — SIGNIFICANT CHANGE UP (ref 11.5–15.5)
HIV 1+2 AB+HIV1 P24 AG SERPL QL IA: SIGNIFICANT CHANGE UP
KETONES UR-MCNC: NEGATIVE — SIGNIFICANT CHANGE UP
LEUKOCYTE ESTERASE UR-ACNC: ABNORMAL
LYMPHOCYTES # BLD AUTO: 32.2 % — SIGNIFICANT CHANGE UP (ref 13–44)
MCHC RBC-ENTMCNC: 28.4 PG — SIGNIFICANT CHANGE UP (ref 27–34)
MCHC RBC-ENTMCNC: 33.7 G/DL — SIGNIFICANT CHANGE UP (ref 32–36)
MCV RBC AUTO: 84.3 FL — SIGNIFICANT CHANGE UP (ref 80–100)
MONOCYTES NFR BLD AUTO: 5.8 % — SIGNIFICANT CHANGE UP (ref 2–14)
NEUTROPHILS NFR BLD AUTO: 59.1 % — SIGNIFICANT CHANGE UP (ref 43–77)
NITRITE UR-MCNC: NEGATIVE — SIGNIFICANT CHANGE UP
PH UR: 6 — SIGNIFICANT CHANGE UP (ref 5–8)
PLATELET # BLD AUTO: 270 K/UL — SIGNIFICANT CHANGE UP (ref 150–400)
POTASSIUM SERPL-MCNC: 4.6 MMOL/L — SIGNIFICANT CHANGE UP (ref 3.5–5.3)
POTASSIUM SERPL-SCNC: 4.6 MMOL/L — SIGNIFICANT CHANGE UP (ref 3.5–5.3)
PROT SERPL-MCNC: 8.8 G/DL — HIGH (ref 6–8.3)
PROT UR-MCNC: 100 MG/DL
RBC # BLD: 5.28 M/UL — HIGH (ref 3.8–5.2)
RBC # FLD: 14.3 % — SIGNIFICANT CHANGE UP (ref 10.3–16.9)
SODIUM SERPL-SCNC: 141 MMOL/L — SIGNIFICANT CHANGE UP (ref 135–145)
SP GR SPEC: 1.02 — SIGNIFICANT CHANGE UP (ref 1–1.03)
UROBILINOGEN FLD QL: 0.2 E.U./DL — SIGNIFICANT CHANGE UP
WBC # BLD: 6.8 K/UL — SIGNIFICANT CHANGE UP (ref 3.8–10.5)
WBC # FLD AUTO: 6.8 K/UL — SIGNIFICANT CHANGE UP (ref 3.8–10.5)

## 2018-09-11 PROCEDURE — 96375 TX/PRO/DX INJ NEW DRUG ADDON: CPT

## 2018-09-11 PROCEDURE — 76856 US EXAM PELVIC COMPLETE: CPT | Mod: 26,59

## 2018-09-11 PROCEDURE — 80053 COMPREHEN METABOLIC PANEL: CPT

## 2018-09-11 PROCEDURE — 87491 CHLMYD TRACH DNA AMP PROBE: CPT

## 2018-09-11 PROCEDURE — 71046 X-RAY EXAM CHEST 2 VIEWS: CPT | Mod: 26

## 2018-09-11 PROCEDURE — 76830 TRANSVAGINAL US NON-OB: CPT

## 2018-09-11 PROCEDURE — 87086 URINE CULTURE/COLONY COUNT: CPT

## 2018-09-11 PROCEDURE — 87591 N.GONORRHOEAE DNA AMP PROB: CPT

## 2018-09-11 PROCEDURE — 87389 HIV-1 AG W/HIV-1&-2 AB AG IA: CPT

## 2018-09-11 PROCEDURE — 76856 US EXAM PELVIC COMPLETE: CPT

## 2018-09-11 PROCEDURE — 87186 SC STD MICRODIL/AGAR DIL: CPT

## 2018-09-11 PROCEDURE — 99284 EMERGENCY DEPT VISIT MOD MDM: CPT

## 2018-09-11 PROCEDURE — 99284 EMERGENCY DEPT VISIT MOD MDM: CPT | Mod: 25

## 2018-09-11 PROCEDURE — 76830 TRANSVAGINAL US NON-OB: CPT | Mod: 26

## 2018-09-11 PROCEDURE — 85025 COMPLETE CBC W/AUTO DIFF WBC: CPT

## 2018-09-11 PROCEDURE — 81001 URINALYSIS AUTO W/SCOPE: CPT

## 2018-09-11 PROCEDURE — 96374 THER/PROPH/DIAG INJ IV PUSH: CPT

## 2018-09-11 PROCEDURE — 71046 X-RAY EXAM CHEST 2 VIEWS: CPT

## 2018-09-11 RX ORDER — SODIUM CHLORIDE 9 MG/ML
1000 INJECTION INTRAMUSCULAR; INTRAVENOUS; SUBCUTANEOUS ONCE
Qty: 0 | Refills: 0 | Status: COMPLETED | OUTPATIENT
Start: 2018-09-11 | End: 2018-09-11

## 2018-09-11 RX ORDER — PHENAZOPYRIDINE HCL 100 MG
1 TABLET ORAL
Qty: 6 | Refills: 0 | OUTPATIENT
Start: 2018-09-11 | End: 2018-09-12

## 2018-09-11 RX ORDER — KETOROLAC TROMETHAMINE 30 MG/ML
15 SYRINGE (ML) INJECTION ONCE
Qty: 0 | Refills: 0 | Status: DISCONTINUED | OUTPATIENT
Start: 2018-09-11 | End: 2018-09-11

## 2018-09-11 RX ADMIN — Medication 15 MILLIGRAM(S): at 17:11

## 2018-09-11 RX ADMIN — SODIUM CHLORIDE 2000 MILLILITER(S): 9 INJECTION INTRAMUSCULAR; INTRAVENOUS; SUBCUTANEOUS at 17:11

## 2018-09-11 NOTE — ED PROVIDER NOTE - PHYSICAL EXAMINATION
VITAL SIGNS: I have reviewed nursing notes and confirm.  CONSTITUTIONAL: Well-developed; well-nourished; in no acute distress.  SKIN: Agree with RN documentation regarding decubitus evaluation. Remainder of skin exam is warm and dry, no acute rash.  HEAD: Normocephalic; atraumatic.  EYES: PERRL, EOM intact; conjunctiva and sclera clear.  ENT: No nasal discharge; airway clear.  NECK: Supple; non tender.  CARD: S1, S2 normal; no murmurs, gallops, or rubs. Regular rate and rhythm.  RESP: No wheezes, rales or rhonchi.  ABD: Normal bowel sounds; soft; non-distended; non-tender  : No CVA TTP b/l, + suprapubic TTP  PELVIC: No external lesions, scant white physiologic fluid, no cervical lesions, no CMT, no adnexal TTP b/l, no bleeding/blood in vault  EXT: Normal ROM. No clubbing, cyanosis or edema.  LYMPH: No acute cervical adenopathy.  NEURO: Alert, oriented. Grossly unremarkable.  PSYCH: Cooperative, appropriate.

## 2018-09-11 NOTE — ED PROVIDER NOTE - MEDICAL DECISION MAKING DETAILS
+ UTI/cystitis, no clinical evidence of pyelo. No ANJALI. Sx improved s/p toradol. Started on levaquin in ED, home with 10 days supply. F/u with PMD. pyridium for dysuria. Given return precautions.

## 2018-09-11 NOTE — ED ADULT NURSE NOTE - OBJECTIVE STATEMENT
Pt reports runny nose, head congestion since Sunday and since last Wednesday has been having lower abd pain with bright red bleeding and "spotting" every time she uses the bathroom. Pt denies n/v, chills, fever, chest pain. Pt reports SOB at baseline. Pt denies dizziness, weakness.

## 2018-09-11 NOTE — ED PROVIDER NOTE - OBJECTIVE STATEMENT
52 y/o F w/hx NIDDM, HTN, HLD, s/p hysterectomy 5 yrs ago, no f/u since that time, p/w intermittent cough and chest tightness for past 4-5 days, no fever/chills. Cough is non-productive. + sick contacts of daughter at home with viral illness. Principally presenting for pelvic pain, dysuria, and hematuria vs vaginal spotting, noticing some redness when she wipes. No vaginal discharge or itching. No n/v/c/d. Normal BMs. No flank/back pain. No fever/chills. Has had many UTIs in the past.

## 2018-09-12 LAB
C TRACH RRNA SPEC QL NAA+PROBE: SIGNIFICANT CHANGE UP
N GONORRHOEA RRNA SPEC QL NAA+PROBE: SIGNIFICANT CHANGE UP
SPECIMEN SOURCE: SIGNIFICANT CHANGE UP

## 2018-09-13 LAB
-  AMIKACIN: SIGNIFICANT CHANGE UP
-  AMPICILLIN/SULBACTAM: SIGNIFICANT CHANGE UP
-  AMPICILLIN: SIGNIFICANT CHANGE UP
-  CEFAZOLIN: SIGNIFICANT CHANGE UP
-  CEFTRIAXONE: SIGNIFICANT CHANGE UP
-  CIPROFLOXACIN: SIGNIFICANT CHANGE UP
-  GENTAMICIN: SIGNIFICANT CHANGE UP
-  NITROFURANTOIN: SIGNIFICANT CHANGE UP
-  PIPERACILLIN/TAZOBACTAM: SIGNIFICANT CHANGE UP
-  TOBRAMYCIN: SIGNIFICANT CHANGE UP
-  TRIMETHOPRIM/SULFAMETHOXAZOLE: SIGNIFICANT CHANGE UP
CULTURE RESULTS: SIGNIFICANT CHANGE UP
METHOD TYPE: SIGNIFICANT CHANGE UP
ORGANISM # SPEC MICROSCOPIC CNT: SIGNIFICANT CHANGE UP
ORGANISM # SPEC MICROSCOPIC CNT: SIGNIFICANT CHANGE UP
SPECIMEN SOURCE: SIGNIFICANT CHANGE UP

## 2018-09-15 DIAGNOSIS — Z79.899 OTHER LONG TERM (CURRENT) DRUG THERAPY: ICD-10-CM

## 2018-09-15 DIAGNOSIS — Z79.84 LONG TERM (CURRENT) USE OF ORAL HYPOGLYCEMIC DRUGS: ICD-10-CM

## 2018-09-15 DIAGNOSIS — I10 ESSENTIAL (PRIMARY) HYPERTENSION: ICD-10-CM

## 2018-09-15 DIAGNOSIS — E78.5 HYPERLIPIDEMIA, UNSPECIFIED: ICD-10-CM

## 2018-09-15 DIAGNOSIS — R05 COUGH: ICD-10-CM

## 2018-09-15 DIAGNOSIS — N30.90 CYSTITIS, UNSPECIFIED WITHOUT HEMATURIA: ICD-10-CM

## 2018-09-15 DIAGNOSIS — Z88.0 ALLERGY STATUS TO PENICILLIN: ICD-10-CM

## 2018-09-15 DIAGNOSIS — R07.89 OTHER CHEST PAIN: ICD-10-CM

## 2019-02-08 ENCOUNTER — EMERGENCY (EMERGENCY)
Facility: HOSPITAL | Age: 54
LOS: 1 days | Discharge: HOME | End: 2019-02-08
Admitting: EMERGENCY MEDICINE
Payer: MEDICAID

## 2019-02-08 VITALS
DIASTOLIC BLOOD PRESSURE: 86 MMHG | OXYGEN SATURATION: 98 % | TEMPERATURE: 98 F | RESPIRATION RATE: 18 BRPM | HEART RATE: 97 BPM | SYSTOLIC BLOOD PRESSURE: 161 MMHG | WEIGHT: 257.06 LBS

## 2019-02-08 LAB
ANION GAP SERPL CALC-SCNC: 11 MMOL/L — SIGNIFICANT CHANGE UP (ref 5–17)
BASOPHILS # BLD AUTO: 0.03 K/UL — SIGNIFICANT CHANGE UP (ref 0–0.2)
BASOPHILS NFR BLD AUTO: 0.5 % — SIGNIFICANT CHANGE UP (ref 0–2)
BUN SERPL-MCNC: 11 MG/DL — SIGNIFICANT CHANGE UP (ref 7–23)
CALCIUM SERPL-MCNC: 9.5 MG/DL — SIGNIFICANT CHANGE UP (ref 8.4–10.5)
CHLORIDE SERPL-SCNC: 106 MMOL/L — SIGNIFICANT CHANGE UP (ref 96–108)
CO2 SERPL-SCNC: 24 MMOL/L — SIGNIFICANT CHANGE UP (ref 22–31)
CREAT SERPL-MCNC: 0.7 MG/DL — SIGNIFICANT CHANGE UP (ref 0.5–1.3)
EOSINOPHIL # BLD AUTO: 0.07 K/UL — SIGNIFICANT CHANGE UP (ref 0–0.5)
EOSINOPHIL NFR BLD AUTO: 1.1 % — SIGNIFICANT CHANGE UP (ref 0–6)
GLUCOSE SERPL-MCNC: 131 MG/DL — HIGH (ref 70–99)
HCT VFR BLD CALC: 41.6 % — SIGNIFICANT CHANGE UP (ref 34.5–45)
HGB BLD-MCNC: 13.4 G/DL — SIGNIFICANT CHANGE UP (ref 11.5–15.5)
IMM GRANULOCYTES NFR BLD AUTO: 0.5 % — SIGNIFICANT CHANGE UP (ref 0–1.5)
LYMPHOCYTES # BLD AUTO: 2.02 K/UL — SIGNIFICANT CHANGE UP (ref 1–3.3)
LYMPHOCYTES # BLD AUTO: 32.6 % — SIGNIFICANT CHANGE UP (ref 13–44)
MCHC RBC-ENTMCNC: 27.7 PG — SIGNIFICANT CHANGE UP (ref 27–34)
MCHC RBC-ENTMCNC: 32.2 GM/DL — SIGNIFICANT CHANGE UP (ref 32–36)
MCV RBC AUTO: 86.1 FL — SIGNIFICANT CHANGE UP (ref 80–100)
MONOCYTES # BLD AUTO: 0.37 K/UL — SIGNIFICANT CHANGE UP (ref 0–0.9)
MONOCYTES NFR BLD AUTO: 6 % — SIGNIFICANT CHANGE UP (ref 2–14)
NEUTROPHILS # BLD AUTO: 3.68 K/UL — SIGNIFICANT CHANGE UP (ref 1.8–7.4)
NEUTROPHILS NFR BLD AUTO: 59.3 % — SIGNIFICANT CHANGE UP (ref 43–77)
PLATELET # BLD AUTO: 268 K/UL — SIGNIFICANT CHANGE UP (ref 150–400)
POTASSIUM SERPL-MCNC: 3.7 MMOL/L — SIGNIFICANT CHANGE UP (ref 3.5–5.3)
POTASSIUM SERPL-SCNC: 3.7 MMOL/L — SIGNIFICANT CHANGE UP (ref 3.5–5.3)
RBC # BLD: 4.83 M/UL — SIGNIFICANT CHANGE UP (ref 3.8–5.2)
RBC # FLD: 13.6 % — SIGNIFICANT CHANGE UP (ref 10.3–14.5)
SODIUM SERPL-SCNC: 141 MMOL/L — SIGNIFICANT CHANGE UP (ref 135–145)
WBC # BLD: 6.2 K/UL — SIGNIFICANT CHANGE UP (ref 3.8–10.5)
WBC # FLD AUTO: 6.2 K/UL — SIGNIFICANT CHANGE UP (ref 3.8–10.5)

## 2019-02-08 PROCEDURE — 99283 EMERGENCY DEPT VISIT LOW MDM: CPT

## 2019-02-08 PROCEDURE — 99284 EMERGENCY DEPT VISIT MOD MDM: CPT

## 2019-02-08 PROCEDURE — 36415 COLL VENOUS BLD VENIPUNCTURE: CPT

## 2019-02-08 PROCEDURE — 80048 BASIC METABOLIC PNL TOTAL CA: CPT

## 2019-02-08 PROCEDURE — 85025 COMPLETE CBC W/AUTO DIFF WBC: CPT

## 2019-02-08 RX ORDER — METFORMIN HYDROCHLORIDE 850 MG/1
1 TABLET ORAL
Qty: 28 | Refills: 0 | OUTPATIENT
Start: 2019-02-08 | End: 2019-02-21

## 2019-02-08 NOTE — ED PROVIDER NOTE - NS ED ROS FT
CONSTITUTIONAL: No fever, chills, or weakness  NEURO: No headache, no dizziness, no syncope; No focal weakness/tingling/numbness  EYES: No visual changes  ENT: HPI  PULM: No cough or dyspnea  CV: No chest pain or palpitations  GI: No abdominal pain, vomiting, or diarrhea  : No dysuria, hematuria, frequency  MSK: No neck pain or back pain, no joint pain  SKIN: skin lesion growing right lumbar region since childhood

## 2019-02-08 NOTE — ED PROVIDER NOTE - NSFOLLOWUPINSTRUCTIONS_ED_ALL_ED_FT
Take medications as prescribed.  Follow up with Primary Care Physician as soon as possible.  _______________________________________  Chalazion  A chalazion is a swelling or lump on the eyelid. It can affect the upper or lower eyelid.    What are the causes?  This condition may be caused by:    Long-lasting (chronic) inflammation of the eyelid glands.  A blocked oil gland in the eyelid.    What are the signs or symptoms?  Symptoms of this condition include:    A swelling on the eyelid. The swelling may spread to areas around the eye.  A hard lump on the eyelid. This lump may make it hard to see out of the eye.    How is this diagnosed?  This condition is diagnosed with an examination of the eye.    How is this treated?  This condition is treated by applying a warm compress to the eyelid. If the condition does not improve after two days, it may be treated with:    Surgery.  Medicine that is injected into the chalazion by a health care provider.  Medicine that is applied to the eye.    Follow these instructions at home:  Do not touch the chalazion.  Do not try to remove the pus, such as by squeezing the chalazion or sticking it with a pin or needle.  Do not rub your eyes.  Wash your hands often. Dry your hands with a clean towel.  Keep your face, scalp, and eyebrows clean.  Avoid wearing eye makeup.  Apply a warm, moist compress to the eyelid 4–6 times a day for 10–15 minutes at a time. This will help to open any blocked glands and help to reduce redness and swelling.  Apply over-the-counter and prescription medicines only as told by your health care provider.  If the chalazion does not break open (rupture) on its own in a month, return to your health care provider.  Keep all follow-up appointments as told by your health care provider. This is important.  Contact a health care provider if:  Your eyelid has not improved in 4 weeks.  Your eyelid is getting worse.  You have a fever.  The chalazion does not rupture on its own with home treatment in a month.  Get help right away if:  You have pain in your eye.  Your vision changes.  The chalazion becomes painful or red  The chalazion gets bigger.  This information is not intended to replace advice given to you by your health care provider. Make sure you discuss any questions you have with your health care provider.  _________________________________________  LARYNGITIS - AfterCare(R) Instructions(ER/ED)     Laryngitis    WHAT YOU NEED TO KNOW:    Laryngitis is when your larynx is swollen because of an infection or irritation. The larynx is also called the voice box because it contains your vocal cords. Your vocal cords also swell and change shape, which can cause your voice to sound different.     DISCHARGE INSTRUCTIONS:    Take your medicine as directed. Contact your healthcare provider if you think your medicine is not helping or if you have side effects. Tell him of her if you are allergic to any medicine. Keep a list of the medicines, vitamins, and herbs you take. Include the amounts, and when and why you take them. Bring the list or the pill bottles to follow-up visits. Carry your medicine list with you in case of an emergency.    Prevent laryngitis:     Rest your voice: Do not shout or scream if you get laryngitis often. This will help prevent swelling and irritation of your larynx.      Avoid irritants and harmful substances: Do not breathe in chemicals or allergens, such as pollen. Alcohol and tobacco can also irritate your larynx.      Avoid foods and liquids that can cause acid reflux: These may include carbonated drinks, spicy foods and sauces, citrus fruit, peppermint, and chocolate.      Avoid the spread of germs:     Wash your hands often with soap and water. Carry germ-killing gel with you. You can use the gel to clean your hands when there is no soap and water available.      Do not touch your eyes, nose, or mouth unless you have washed your hands first.      Always cover your mouth when you cough. Cough into a tissue or your shirtsleeve so you do not spread germs from your hands.      Try to avoid people who have a cold or the flu. If you are sick, stay away from others as much as possible.    Follow up with your healthcare provider as directed: Write down your questions so you remember to ask them during your visits.     Contact your healthcare provider if:     You have a fever.      You feel large, tender lumps in your neck.      You are hoarse for more than 7 days.      You have new or increased throat pain.      You have questions about your condition or care.    Return to the emergency department if:     Your throat is bleeding.      You are hoarse for more than 7 days and your chest feels tight.      You are drooling and have trouble swallowing.      You have trouble breathing.

## 2019-02-08 NOTE — ED PROVIDER NOTE - PHYSICAL EXAMINATION
CONSTITUTIONAL: WD,WN. NAD.    SKIN: Normal color and turgor. 2 x 2 irregular, slightly raised, soft, purple, nontender skin lesion right lumbar region.  HEAD: NC/AT.  EYES: Nontender, firm nodule with subtle swelling inside right lower eyelid.  No discrete stye.  No warmth or erythema.  Conjunctiva clear. EOMI. PERRL.  AC clear. No fluorescein uptake.   ENT: Airway patent, OP without erythema, tonsillar swelling or exudate; uvula midline without swelling. Nasal mucosa clear, no rhinorrhea.   RESPIRATORY:  Breathing non-labored. No retractions or accessory muscle use.  Lungs CTA bilat.  CARDIOVASCULAR:  RRR, S1S2. No M/R/G.      GI:  Abdomen soft, nontender.    MSK: Neck supple with painless ROM.  No lower extremity edema or calf tenderness.  No joint swelling or ROM limitation.  NEURO: Alert and oriented; CN II-XII grossly intact. Speech clear. 5/5 strength in all extremities.  Normal balance and gait.

## 2019-02-08 NOTE — ED PROVIDER NOTE - OBJECTIVE STATEMENT
54 yo fem with Hx HTN HLD DM presents to ED with "hoarse voice" intermittently since late 2017, and has had mild intermittent throat irritation & nasal congestion.  Currently no nasal congestion or throat discomfort.  Minimal cough without sputum, no fever/chills, no SOB/GUADARRAMA, or chest discomfort.  She also has had swelling of right lower eyelid since early January, no vision changes, no crusting/matting of lashes.  She has not gone for medical attention for any of her symptoms.  Has no PCP and ran out of her meds some time last year.  She was prescribed metformin, HCTZ, and atorvastatin but hasn't taken meds in months.  Denies any acute weight changes, weakness, polyuria or polydipsia.    PMH as above  PSH cholecystectomy, myomectomy,   Meds as above  ALL: PCN (throat and tongue swelling, rash)

## 2019-02-08 NOTE — ED ADULT NURSE NOTE - OBJECTIVE STATEMENT
Pt is a 54 y/o female who came in to ED for evaluation of left lower eyelid swelling and redness. Pt reports that a "little boy was touching her eyes"

## 2019-02-08 NOTE — ED PROVIDER NOTE - MEDICAL DECISION MAKING DETAILS
Pt with Hx HTN DM HLD presents with intermittent laryngitis since late December, and intermittent swelling of right lower eyelid since early January.  Moderately elevated BP, right lower lid chalazion, but no other evidence of acute illness.  Has no primary care and ran out of meds last year.  No hyperglycemic symptoms.  Will have ED Referral Coordinator arrange primary care follow up. Pt with Hx HTN DM HLD presents with intermittent laryngitis since late December, and intermittent swelling of right lower eyelid since early January.  Moderately elevated BP, right lower lid chalazion, but no other evidence of acute illness.  Has no primary care and ran out of meds last year.  No hyperglycemic symptoms.  Will have ED Referral Coordinator arrange primary care follow up.  Will check basic labs and restart HCTZ and metformin if no significant abnormalities.

## 2019-02-08 NOTE — ED PROVIDER NOTE - CARE PROVIDER_API CALL
Maicol Cordova)  Internal Medicine  [address] [place] [id number]  Phone: [phone]  Fax: [phone]  Follow Up Time:

## 2019-02-10 ENCOUNTER — INBOUND DOCUMENT (OUTPATIENT)
Age: 54
End: 2019-02-10

## 2019-02-12 DIAGNOSIS — Z79.2 LONG TERM (CURRENT) USE OF ANTIBIOTICS: ICD-10-CM

## 2019-02-12 DIAGNOSIS — Z79.84 LONG TERM (CURRENT) USE OF ORAL HYPOGLYCEMIC DRUGS: ICD-10-CM

## 2019-02-12 DIAGNOSIS — Z79.899 OTHER LONG TERM (CURRENT) DRUG THERAPY: ICD-10-CM

## 2019-02-12 DIAGNOSIS — Z88.0 ALLERGY STATUS TO PENICILLIN: ICD-10-CM

## 2019-02-12 DIAGNOSIS — J02.9 ACUTE PHARYNGITIS, UNSPECIFIED: ICD-10-CM

## 2019-02-12 DIAGNOSIS — H00.12 CHALAZION RIGHT LOWER EYELID: ICD-10-CM

## 2019-02-12 DIAGNOSIS — I10 ESSENTIAL (PRIMARY) HYPERTENSION: ICD-10-CM

## 2019-02-12 DIAGNOSIS — E11.9 TYPE 2 DIABETES MELLITUS WITHOUT COMPLICATIONS: ICD-10-CM

## 2019-02-12 DIAGNOSIS — R09.81 NASAL CONGESTION: ICD-10-CM

## 2019-04-16 ENCOUNTER — APPOINTMENT (OUTPATIENT)
Dept: INTERNAL MEDICINE | Facility: CLINIC | Age: 54
End: 2019-04-16
Payer: MEDICAID

## 2019-04-16 ENCOUNTER — LABORATORY RESULT (OUTPATIENT)
Age: 54
End: 2019-04-16

## 2019-04-16 VITALS
BODY MASS INDEX: 43.01 KG/M2 | DIASTOLIC BLOOD PRESSURE: 96 MMHG | HEART RATE: 101 BPM | SYSTOLIC BLOOD PRESSURE: 151 MMHG | HEIGHT: 64.5 IN | WEIGHT: 255 LBS | OXYGEN SATURATION: 98 % | TEMPERATURE: 98.6 F

## 2019-04-16 DIAGNOSIS — E66.9 OBESITY, UNSPECIFIED: ICD-10-CM

## 2019-04-16 DIAGNOSIS — Z78.9 OTHER SPECIFIED HEALTH STATUS: ICD-10-CM

## 2019-04-16 DIAGNOSIS — E55.9 VITAMIN D DEFICIENCY, UNSPECIFIED: ICD-10-CM

## 2019-04-16 DIAGNOSIS — L98.9 DISORDER OF THE SKIN AND SUBCUTANEOUS TISSUE, UNSPECIFIED: ICD-10-CM

## 2019-04-16 DIAGNOSIS — F32.9 MAJOR DEPRESSIVE DISORDER, SINGLE EPISODE, UNSPECIFIED: ICD-10-CM

## 2019-04-16 DIAGNOSIS — Z11.3 ENCOUNTER FOR SCREENING FOR INFECTIONS WITH A PREDOMINANTLY SEXUAL MODE OF TRANSMISSION: ICD-10-CM

## 2019-04-16 DIAGNOSIS — Z80.0 FAMILY HISTORY OF MALIGNANT NEOPLASM OF DIGESTIVE ORGANS: ICD-10-CM

## 2019-04-16 DIAGNOSIS — Z56.0 UNEMPLOYMENT, UNSPECIFIED: ICD-10-CM

## 2019-04-16 DIAGNOSIS — Z82.3 FAMILY HISTORY OF STROKE: ICD-10-CM

## 2019-04-16 DIAGNOSIS — Z12.31 ENCOUNTER FOR SCREENING MAMMOGRAM FOR MALIGNANT NEOPLASM OF BREAST: ICD-10-CM

## 2019-04-16 DIAGNOSIS — Z83.3 FAMILY HISTORY OF DIABETES MELLITUS: ICD-10-CM

## 2019-04-16 PROCEDURE — G0444 DEPRESSION SCREEN ANNUAL: CPT

## 2019-04-16 PROCEDURE — 36415 COLL VENOUS BLD VENIPUNCTURE: CPT

## 2019-04-16 PROCEDURE — 99204 OFFICE O/P NEW MOD 45 MIN: CPT | Mod: 25

## 2019-04-16 RX ORDER — METFORMIN HYDROCHLORIDE 500 MG/1
500 TABLET, COATED ORAL
Qty: 180 | Refills: 0 | Status: ACTIVE | COMMUNITY
Start: 2019-04-16 | End: 1900-01-01

## 2019-04-16 SDOH — ECONOMIC STABILITY - INCOME SECURITY: UNEMPLOYMENT, UNSPECIFIED: Z56.0

## 2019-04-17 LAB
25(OH)D3 SERPL-MCNC: 25.6 NG/ML
ALBUMIN SERPL ELPH-MCNC: 4.6 G/DL
ALP BLD-CCNC: 126 U/L
ALT SERPL-CCNC: 31 U/L
ANION GAP SERPL CALC-SCNC: 14 MMOL/L
AST SERPL-CCNC: 24 U/L
BASOPHILS # BLD AUTO: 0.04 K/UL
BASOPHILS NFR BLD AUTO: 0.7 %
BILIRUB SERPL-MCNC: 0.3 MG/DL
BUN SERPL-MCNC: 10 MG/DL
CALCIUM SERPL-MCNC: 9.8 MG/DL
CHLORIDE SERPL-SCNC: 102 MMOL/L
CHOLEST SERPL-MCNC: 182 MG/DL
CHOLEST/HDLC SERPL: 4.8 RATIO
CO2 SERPL-SCNC: 27 MMOL/L
CREAT SERPL-MCNC: 0.78 MG/DL
CREAT SPEC-SCNC: 240 MG/DL
EOSINOPHIL # BLD AUTO: 0.06 K/UL
EOSINOPHIL NFR BLD AUTO: 1.1 %
ESTIMATED AVERAGE GLUCOSE: 143 MG/DL
GLUCOSE SERPL-MCNC: 128 MG/DL
HBA1C MFR BLD HPLC: 6.6 %
HBV CORE IGG+IGM SER QL: NONREACTIVE
HBV SURFACE AB SER QL: REACTIVE
HBV SURFACE AG SER QL: NONREACTIVE
HCT VFR BLD CALC: 45.1 %
HCV AB SER QL: NONREACTIVE
HCV S/CO RATIO: 0.45 S/CO
HDLC SERPL-MCNC: 38 MG/DL
HEPATITIS A IGG ANTIBODY: REACTIVE
HGB BLD-MCNC: 13.9 G/DL
HIV1+2 AB SPEC QL IA.RAPID: NONREACTIVE
IMM GRANULOCYTES NFR BLD AUTO: 0.2 %
LDLC SERPL CALC-MCNC: 112 MG/DL
LYMPHOCYTES # BLD AUTO: 2.33 K/UL
LYMPHOCYTES NFR BLD AUTO: 41.5 %
MAN DIFF?: NORMAL
MCHC RBC-ENTMCNC: 27.4 PG
MCHC RBC-ENTMCNC: 30.8 GM/DL
MCV RBC AUTO: 89 FL
MICROALBUMIN 24H UR DL<=1MG/L-MCNC: 2.6 MG/DL
MICROALBUMIN/CREAT 24H UR-RTO: 11 MG/G
MONOCYTES # BLD AUTO: 0.3 K/UL
MONOCYTES NFR BLD AUTO: 5.3 %
NEUTROPHILS # BLD AUTO: 2.88 K/UL
NEUTROPHILS NFR BLD AUTO: 51.2 %
PLATELET # BLD AUTO: 314 K/UL
POTASSIUM SERPL-SCNC: 4.7 MMOL/L
PROT SERPL-MCNC: 7.8 G/DL
RBC # BLD: 5.07 M/UL
RBC # FLD: 14.8 %
SODIUM SERPL-SCNC: 143 MMOL/L
T PALLIDUM AB SER QL IA: NEGATIVE
TRIGL SERPL-MCNC: 160 MG/DL
TSH SERPL-ACNC: 2.31 UIU/ML
VIT B12 SERPL-MCNC: 299 PG/ML
WBC # FLD AUTO: 5.62 K/UL

## 2019-04-23 LAB
HOMOCYSTEINE LEVEL: 12 UMOL/L
METHYLMALONIC ACID LEVEL: 142 NMOL/L

## 2019-05-07 ENCOUNTER — APPOINTMENT (OUTPATIENT)
Dept: INTERNAL MEDICINE | Facility: CLINIC | Age: 54
End: 2019-05-07
Payer: MEDICAID

## 2019-05-07 VITALS
HEART RATE: 102 BPM | TEMPERATURE: 98.6 F | WEIGHT: 255 LBS | SYSTOLIC BLOOD PRESSURE: 152 MMHG | OXYGEN SATURATION: 99 % | HEIGHT: 64.5 IN | BODY MASS INDEX: 43.01 KG/M2 | DIASTOLIC BLOOD PRESSURE: 83 MMHG

## 2019-05-07 DIAGNOSIS — E53.8 DEFICIENCY OF OTHER SPECIFIED B GROUP VITAMINS: ICD-10-CM

## 2019-05-07 DIAGNOSIS — E66.01 MORBID (SEVERE) OBESITY DUE TO EXCESS CALORIES: ICD-10-CM

## 2019-05-07 DIAGNOSIS — E78.5 HYPERLIPIDEMIA, UNSPECIFIED: ICD-10-CM

## 2019-05-07 PROCEDURE — 99214 OFFICE O/P EST MOD 30 MIN: CPT

## 2019-05-07 RX ORDER — MULTIVITAMIN
CAPSULE ORAL
Refills: 0 | Status: ACTIVE | COMMUNITY
Start: 2019-05-07

## 2019-05-07 RX ORDER — BLOOD-GLUCOSE METER
KIT MISCELLANEOUS
Qty: 1 | Refills: 0 | Status: ACTIVE | COMMUNITY
Start: 2019-05-07 | End: 1900-01-01

## 2019-05-08 ENCOUNTER — APPOINTMENT (OUTPATIENT)
Dept: DERMATOLOGY | Facility: CLINIC | Age: 54
End: 2019-05-08
Payer: MEDICAID

## 2019-05-08 ENCOUNTER — LABORATORY RESULT (OUTPATIENT)
Age: 54
End: 2019-05-08

## 2019-05-08 VITALS — DIASTOLIC BLOOD PRESSURE: 84 MMHG | SYSTOLIC BLOOD PRESSURE: 137 MMHG

## 2019-05-08 DIAGNOSIS — Z87.2 PERSONAL HISTORY OF DISEASES OF THE SKIN AND SUBCUTANEOUS TISSUE: ICD-10-CM

## 2019-05-08 DIAGNOSIS — L82.1 OTHER SEBORRHEIC KERATOSIS: ICD-10-CM

## 2019-05-08 DIAGNOSIS — L91.0 HYPERTROPHIC SCAR: ICD-10-CM

## 2019-05-08 DIAGNOSIS — D48.9 NEOPLASM OF UNCERTAIN BEHAVIOR, UNSPECIFIED: ICD-10-CM

## 2019-05-08 DIAGNOSIS — C44.99 OTHER SPECIFIED MALIGNANT NEOPLASM OF SKIN, UNSPECIFIED: ICD-10-CM

## 2019-05-08 PROCEDURE — 11900 INJECT SKIN LESIONS </W 7: CPT

## 2019-05-08 PROCEDURE — 99203 OFFICE O/P NEW LOW 30 MIN: CPT | Mod: 25

## 2019-05-08 PROCEDURE — 11104 PUNCH BX SKIN SINGLE LESION: CPT

## 2019-05-09 RX ORDER — ATORVASTATIN CALCIUM 10 MG/1
10 TABLET, FILM COATED ORAL
Qty: 90 | Refills: 0 | Status: ACTIVE | COMMUNITY
Start: 2019-05-09 | End: 1900-01-01

## 2019-05-09 RX ORDER — ROSUVASTATIN CALCIUM 5 MG/1
5 TABLET, FILM COATED ORAL
Qty: 90 | Refills: 0 | Status: DISCONTINUED | COMMUNITY
Start: 2019-05-07 | End: 2019-05-09

## 2019-05-16 PROBLEM — C44.99 DERMATOFIBROSARCOMA: Status: ACTIVE | Noted: 2019-05-16

## 2019-05-21 ENCOUNTER — FORM ENCOUNTER (OUTPATIENT)
Age: 54
End: 2019-05-21

## 2019-05-22 ENCOUNTER — OUTPATIENT (OUTPATIENT)
Dept: OUTPATIENT SERVICES | Facility: HOSPITAL | Age: 54
LOS: 1 days | End: 2019-05-22
Payer: MEDICAID

## 2019-05-22 PROCEDURE — 71046 X-RAY EXAM CHEST 2 VIEWS: CPT

## 2019-05-22 PROCEDURE — 71046 X-RAY EXAM CHEST 2 VIEWS: CPT | Mod: 26

## 2019-06-11 ENCOUNTER — MOBILE ON CALL (OUTPATIENT)
Age: 54
End: 2019-06-11

## 2019-06-17 ENCOUNTER — APPOINTMENT (OUTPATIENT)
Dept: INTERNAL MEDICINE | Facility: CLINIC | Age: 54
End: 2019-06-17
Payer: MEDICAID

## 2019-06-17 VITALS
HEIGHT: 64 IN | WEIGHT: 249 LBS | BODY MASS INDEX: 42.51 KG/M2 | DIASTOLIC BLOOD PRESSURE: 89 MMHG | SYSTOLIC BLOOD PRESSURE: 160 MMHG | TEMPERATURE: 98 F | HEART RATE: 110 BPM | OXYGEN SATURATION: 94 %

## 2019-06-17 DIAGNOSIS — I10 ESSENTIAL (PRIMARY) HYPERTENSION: ICD-10-CM

## 2019-06-17 DIAGNOSIS — Z11.59 ENCOUNTER FOR SCREENING FOR OTHER VIRAL DISEASES: ICD-10-CM

## 2019-06-17 DIAGNOSIS — R49.0 DYSPHONIA: ICD-10-CM

## 2019-06-17 DIAGNOSIS — E11.9 TYPE 2 DIABETES MELLITUS W/OUT COMPLICATIONS: ICD-10-CM

## 2019-06-17 PROCEDURE — 99214 OFFICE O/P EST MOD 30 MIN: CPT | Mod: 25

## 2019-06-17 PROCEDURE — 36415 COLL VENOUS BLD VENIPUNCTURE: CPT

## 2019-06-17 RX ORDER — HYDROCHLOROTHIAZIDE 25 MG/1
25 TABLET ORAL DAILY
Qty: 90 | Refills: 0 | Status: ACTIVE | COMMUNITY
Start: 2019-06-17 | End: 1900-01-01

## 2019-06-17 RX ORDER — HYDROCHLOROTHIAZIDE 12.5 MG/1
12.5 TABLET ORAL
Qty: 90 | Refills: 0 | Status: DISCONTINUED | COMMUNITY
Start: 2019-05-07 | End: 2019-06-17

## 2019-06-18 LAB
ANION GAP SERPL CALC-SCNC: 19 MMOL/L
BUN SERPL-MCNC: 15 MG/DL
CALCIUM SERPL-MCNC: 10.1 MG/DL
CHLORIDE SERPL-SCNC: 101 MMOL/L
CO2 SERPL-SCNC: 23 MMOL/L
CREAT SERPL-MCNC: 0.95 MG/DL
GLUCOSE SERPL-MCNC: 140 MG/DL
MEV IGG FLD QL IA: >300 AU/ML
MEV IGG+IGM SER-IMP: POSITIVE
POTASSIUM SERPL-SCNC: 4.1 MMOL/L
SODIUM SERPL-SCNC: 143 MMOL/L

## 2019-07-11 ENCOUNTER — APPOINTMENT (OUTPATIENT)
Dept: OTOLARYNGOLOGY | Facility: CLINIC | Age: 54
End: 2019-07-11
Payer: MEDICAID

## 2019-07-11 VITALS
HEIGHT: 64 IN | TEMPERATURE: 98.6 F | BODY MASS INDEX: 42.51 KG/M2 | DIASTOLIC BLOOD PRESSURE: 95 MMHG | SYSTOLIC BLOOD PRESSURE: 153 MMHG | HEART RATE: 97 BPM | WEIGHT: 249 LBS | OXYGEN SATURATION: 96 %

## 2019-07-11 DIAGNOSIS — J38.7 OTHER DISEASES OF LARYNX: ICD-10-CM

## 2019-07-11 DIAGNOSIS — K21.9 GASTRO-ESOPHAGEAL REFLUX DISEASE W/OUT ESOPHAGITIS: ICD-10-CM

## 2019-07-11 PROCEDURE — 99204 OFFICE O/P NEW MOD 45 MIN: CPT | Mod: 25

## 2019-07-11 PROCEDURE — 31575 DIAGNOSTIC LARYNGOSCOPY: CPT

## 2019-07-11 RX ORDER — RANITIDINE 150 MG/1
150 TABLET ORAL
Qty: 60 | Refills: 5 | Status: ACTIVE | COMMUNITY
Start: 2019-07-11 | End: 1900-01-01

## 2019-07-11 RX ORDER — OMEPRAZOLE 20 MG/1
20 CAPSULE, DELAYED RELEASE ORAL DAILY
Qty: 30 | Refills: 2 | Status: ACTIVE | COMMUNITY
Start: 2019-07-11 | End: 1900-01-01

## 2019-07-11 NOTE — PHYSICAL EXAM
[FreeTextEntry1] : nearly aphonic [Laryngoscopy Performed] : laryngoscopy was performed, see procedure section for findings [de-identified] : [facility] 1 [Normal] : no rashes

## 2019-07-11 NOTE — ASSESSMENT
[FreeTextEntry1] : Explained that her laryngeal endoscopy findings are mild and don't explain her aphonia. Given the intermittent nature will try a trial of LPR tx & refer for strobe/speech & to see Dr. Najera if no response. \par Reviewed reflux precautions and provided the patient with the corresponding educational handout.\par

## 2019-07-11 NOTE — HISTORY OF PRESENT ILLNESS
[de-identified] : Trouble with her voice since her 20s that 10/18 evolved into periods of intermittent aphonia lasting up to a week. Denies heartburn/reflux; some dyspnea micheal when lying down. No unexpected weight loss, dysphagia, hemoptysis, and never was a smoker. \par No known apnea problem or snoring. Intermittent daytime sleepiness; (+) HTN.

## 2019-07-11 NOTE — PROCEDURE
[de-identified] : Indication: requirement for exam not possible via anterior examination; aphonia\par After verbal consent and the administration of an aerosolized phenylephrine/lidocaine mix, examination was performed with a flexible endoscope placed through the nose. Findings:\par Nasopharynx: unremarkable\par Soft palate, lateral and posterior pharyngeal walls: unremarkable\par Base of tongue & lingual tonsil: minimal retrodisplacement\par Vallecula: unremarkable\par Epiglottis: unremarkable\par Piriform sinuses and pharyngoesophageal junction: unremarkable\par Arytenoids and AE folds: mild interarytenoid edema\par Ventricle and false vocal folds: unremarkable\par True vocal folds: Smooth free edge w/ very mild bowing; surface without ectasias or edema; normal movement bilaterally with good apposition in phonation\par Visible subglottis: unremarkable\par Other findings: mild pseudosulcus; mucosal erythema & cobblestoning

## 2019-07-17 ENCOUNTER — APPOINTMENT (OUTPATIENT)
Dept: DERMATOLOGY | Facility: CLINIC | Age: 54
End: 2019-07-17

## 2019-09-01 ENCOUNTER — INBOUND DOCUMENT (OUTPATIENT)
Age: 54
End: 2019-09-01

## 2019-09-17 ENCOUNTER — APPOINTMENT (OUTPATIENT)
Dept: INTERNAL MEDICINE | Facility: CLINIC | Age: 54
End: 2019-09-17

## 2020-01-07 NOTE — BEHAVIORAL HEALTH ASSESSMENT NOTE - OTHER PAST PSYCHIATRIC HISTORY (INCLUDE DETAILS REGARDING ONSET, COURSE OF ILLNESS, INPATIENT/OUTPATIENT TREATMENT)
No prior hospitalizations, no prior outpatient mental health treatment. Attending Attestation: Dr. Boyle  I have personally performed a history and physical examination of the patient and discussed management with the resident as well as the patient.  I reviewed the resident's note and agree with the documented findings and plan of care.  I have authored and modified critical sections of the Provider Note, including but not limited to HPI, Physical Exam and MDM. Pt presenting from group home w/ decreased PO intake, has had minimal food intake for the last 2 days per staff. Pt is eating out of snack bag when evaluated in room. Pt nonverbal at baseline but appears comfortable, abd exam benign - soft and NT - VS stable, at this time no indication for further w/u or studies, pt actively tolerating PO and w/ normal exam. Stable for dc back to group home. Will check UA/UC

## 2023-08-08 NOTE — ED BEHAVIORAL HEALTH ASSESSMENT NOTE - NS ED BHA ED COURSE UTILIZATION OF 1 TO 1 IN ED YN
Pt reports living in a ranch style home, +FOS to basement. At baseline, pt ambulates with a cane. Carries out ADLs independently. Yes

## 2023-12-28 NOTE — BEHAVIORAL HEALTH ASSESSMENT NOTE - RECENT MEMORY
Patient seen in anticoagulation clinic. Reviewed past INR and dose with patient. Denies any missed doses of warfarin. Patient denies any changes in medications or health. Reports vitamin K intake has been consistent. Reviewed INR goal.  INR 3.2.  Will continue with current Warfarin dosing and recheck INR in 4 weeks.  Patient reminded to call office with any changes in medications or health. Warfarin dosed per protocol. On site provider dr. Zhou.    Normal

## 2024-03-13 ENCOUNTER — APPOINTMENT (OUTPATIENT)
Dept: ORTHOPEDIC SURGERY | Facility: CLINIC | Age: 59
End: 2024-03-13
Payer: MEDICAID

## 2024-03-13 VITALS — BODY MASS INDEX: 42.51 KG/M2 | HEIGHT: 64 IN | WEIGHT: 249 LBS

## 2024-03-13 DIAGNOSIS — S86.012A STRAIN OF LEFT ACHILLES TENDON, INITIAL ENCOUNTER: ICD-10-CM

## 2024-03-13 PROCEDURE — 73610 X-RAY EXAM OF ANKLE: CPT | Mod: LT

## 2024-03-13 PROCEDURE — 99203 OFFICE O/P NEW LOW 30 MIN: CPT

## 2024-03-13 NOTE — HISTORY OF PRESENT ILLNESS
[de-identified] : Initial visit:  Left ankle  Reason: fell  Duration: 3/5/2024 Symptoms: popping / swelling / burning Aggravating: walking   Alleviating: elevation / ice  Pain level: 5/10 Pain medication: OTC Tylenol / Aleve Allergies: penicillin

## 2024-03-13 NOTE — PHYSICAL EXAM
[de-identified] : Left ankle  Constitutional:  The patient is healthy-appearing and in no apparent distress.   Gait and Station:  The patient ambulates with a normal gait and no limp.   Cardiovascular System:  The capillary refill is less than 2 seconds.   Skin:  There are no skin abnormalities of ankle.  Ankles and Feet:  Inspection:  There is no erythema. There is no induration. There is no warmth. There is no deformity.   There is mild posterior swelling.   Bony Palpation:  There is no tenderness of the calcaneal tuberosity. There is no tenderness of the metatarsals. There is no tenderness of the tarsometatarsal joints There is no tenderness of the navicular tuberosity.  There is no tenderness of the dome of talus. There is no tenderness of the head of talus. There is no tenderness of the inferior tibiofibular joint.  Soft Tissue Palpation:  There is no tenderness of the tibialis posterior. There is no tenderness of the tibialis anterior.  There is no tenderness of the plantar fascia. There is tenderness of the Achilles tendon. There is no tenderness of the extensor hallucis longus. There is no tenderness of the sinus tarsi.  There is no tenderness of the peroneus longus and brevis. There is no tenderness of the deltoid ligament.    There is no tenderness of the anterior talofibular ligament and the calcaneofibular ligament.   Active Range of Motion:  There is full plantarflexion and dorsiflexion to 5 degrees  Stability:  The anterior drawer is negative.   Strength:  There is 5/5 ankle plantarflexion and 4 out of 5 dorsiflexion  Neurological System:  There is normal sensation to light touch at the ankle and foot.   Psychiatric:  The patient demonstrates a normal mood and affect and is active and alert.  [de-identified] : X-ray left ankle: Insert normal except for calcification in the mid Achilles

## 2024-03-13 NOTE — ASSESSMENT
[FreeTextEntry1] : Discussed at length patient exam history and imaging and concern for partial Achilles tendon rupture will be sent for an MRI for further evaluation

## 2024-03-28 ENCOUNTER — APPOINTMENT (OUTPATIENT)
Dept: MRI IMAGING | Facility: CLINIC | Age: 59
End: 2024-03-28

## 2024-04-02 DIAGNOSIS — M67.88 OTHER SPECIFIED DISORDERS OF SYNOVIUM AND TENDON, OTHER SITE: ICD-10-CM

## 2024-09-17 ENCOUNTER — EMERGENCY (EMERGENCY)
Facility: HOSPITAL | Age: 59
LOS: 1 days | Discharge: HOME | End: 2024-09-17
Admitting: EMERGENCY MEDICINE
Payer: MEDICAID

## 2024-09-17 VITALS
TEMPERATURE: 98 F | OXYGEN SATURATION: 99 % | HEIGHT: 65 IN | SYSTOLIC BLOOD PRESSURE: 156 MMHG | WEIGHT: 164.91 LBS | DIASTOLIC BLOOD PRESSURE: 97 MMHG | HEART RATE: 95 BPM | RESPIRATION RATE: 18 BRPM

## 2024-09-17 LAB
ANION GAP SERPL CALC-SCNC: 14 MMOL/L — SIGNIFICANT CHANGE UP (ref 5–17)
APTT BLD: 32.5 SEC — SIGNIFICANT CHANGE UP (ref 24.5–35.6)
BASOPHILS # BLD AUTO: 0.03 K/UL — SIGNIFICANT CHANGE UP (ref 0–0.2)
BASOPHILS NFR BLD AUTO: 0.5 % — SIGNIFICANT CHANGE UP (ref 0–2)
BUN SERPL-MCNC: 9 MG/DL — SIGNIFICANT CHANGE UP (ref 7–23)
CALCIUM SERPL-MCNC: 9.7 MG/DL — SIGNIFICANT CHANGE UP (ref 8.4–10.5)
CHLORIDE SERPL-SCNC: 101 MMOL/L — SIGNIFICANT CHANGE UP (ref 96–108)
CO2 SERPL-SCNC: 26 MMOL/L — SIGNIFICANT CHANGE UP (ref 22–31)
CREAT SERPL-MCNC: 0.77 MG/DL — SIGNIFICANT CHANGE UP (ref 0.5–1.3)
EGFR: 89 ML/MIN/1.73M2 — SIGNIFICANT CHANGE UP
EOSINOPHIL # BLD AUTO: 0.09 K/UL — SIGNIFICANT CHANGE UP (ref 0–0.5)
EOSINOPHIL NFR BLD AUTO: 1.4 % — SIGNIFICANT CHANGE UP (ref 0–6)
GLUCOSE SERPL-MCNC: 125 MG/DL — HIGH (ref 70–99)
HCT VFR BLD CALC: 44.5 % — SIGNIFICANT CHANGE UP (ref 34.5–45)
HGB BLD-MCNC: 14.5 G/DL — SIGNIFICANT CHANGE UP (ref 11.5–15.5)
IMM GRANULOCYTES NFR BLD AUTO: 0.5 % — SIGNIFICANT CHANGE UP (ref 0–0.9)
INR BLD: 0.97 — SIGNIFICANT CHANGE UP (ref 0.85–1.18)
LYMPHOCYTES # BLD AUTO: 2.34 K/UL — SIGNIFICANT CHANGE UP (ref 1–3.3)
LYMPHOCYTES # BLD AUTO: 37.2 % — SIGNIFICANT CHANGE UP (ref 13–44)
MCHC RBC-ENTMCNC: 27.3 PG — SIGNIFICANT CHANGE UP (ref 27–34)
MCHC RBC-ENTMCNC: 32.6 GM/DL — SIGNIFICANT CHANGE UP (ref 32–36)
MCV RBC AUTO: 83.8 FL — SIGNIFICANT CHANGE UP (ref 80–100)
MONOCYTES # BLD AUTO: 0.4 K/UL — SIGNIFICANT CHANGE UP (ref 0–0.9)
MONOCYTES NFR BLD AUTO: 6.4 % — SIGNIFICANT CHANGE UP (ref 2–14)
NEUTROPHILS # BLD AUTO: 3.4 K/UL — SIGNIFICANT CHANGE UP (ref 1.8–7.4)
NEUTROPHILS NFR BLD AUTO: 54 % — SIGNIFICANT CHANGE UP (ref 43–77)
NRBC # BLD: 0 /100 WBCS — SIGNIFICANT CHANGE UP (ref 0–0)
PLATELET # BLD AUTO: 270 K/UL — SIGNIFICANT CHANGE UP (ref 150–400)
POTASSIUM SERPL-MCNC: 4.1 MMOL/L — SIGNIFICANT CHANGE UP (ref 3.5–5.3)
POTASSIUM SERPL-SCNC: 4.1 MMOL/L — SIGNIFICANT CHANGE UP (ref 3.5–5.3)
PROTHROM AB SERPL-ACNC: 11.1 SEC — SIGNIFICANT CHANGE UP (ref 9.5–13)
RBC # BLD: 5.31 M/UL — HIGH (ref 3.8–5.2)
RBC # FLD: 13.4 % — SIGNIFICANT CHANGE UP (ref 10.3–14.5)
SODIUM SERPL-SCNC: 141 MMOL/L — SIGNIFICANT CHANGE UP (ref 135–145)
WBC # BLD: 6.29 K/UL — SIGNIFICANT CHANGE UP (ref 3.8–10.5)
WBC # FLD AUTO: 6.29 K/UL — SIGNIFICANT CHANGE UP (ref 3.8–10.5)

## 2024-09-17 PROCEDURE — 87205 SMEAR GRAM STAIN: CPT

## 2024-09-17 PROCEDURE — 88305 TISSUE EXAM BY PATHOLOGIST: CPT

## 2024-09-17 PROCEDURE — 76942 ECHO GUIDE FOR BIOPSY: CPT | Mod: 26

## 2024-09-17 PROCEDURE — 87077 CULTURE AEROBIC IDENTIFY: CPT

## 2024-09-17 PROCEDURE — 99284 EMERGENCY DEPT VISIT MOD MDM: CPT | Mod: 25

## 2024-09-17 PROCEDURE — 96374 THER/PROPH/DIAG INJ IV PUSH: CPT | Mod: XU

## 2024-09-17 PROCEDURE — 88173 CYTOPATH EVAL FNA REPORT: CPT | Mod: 26

## 2024-09-17 PROCEDURE — 85730 THROMBOPLASTIN TIME PARTIAL: CPT

## 2024-09-17 PROCEDURE — 19000 PUNCTURE ASPIR CYST BREAST: CPT | Mod: RT

## 2024-09-17 PROCEDURE — 76642 ULTRASOUND BREAST LIMITED: CPT | Mod: 26,RT

## 2024-09-17 PROCEDURE — 88173 CYTOPATH EVAL FNA REPORT: CPT

## 2024-09-17 PROCEDURE — 87075 CULTR BACTERIA EXCEPT BLOOD: CPT

## 2024-09-17 PROCEDURE — 87070 CULTURE OTHR SPECIMN AEROBIC: CPT

## 2024-09-17 PROCEDURE — 36415 COLL VENOUS BLD VENIPUNCTURE: CPT

## 2024-09-17 PROCEDURE — 80048 BASIC METABOLIC PNL TOTAL CA: CPT

## 2024-09-17 PROCEDURE — 88305 TISSUE EXAM BY PATHOLOGIST: CPT | Mod: 26

## 2024-09-17 PROCEDURE — 76642 ULTRASOUND BREAST LIMITED: CPT

## 2024-09-17 PROCEDURE — 85610 PROTHROMBIN TIME: CPT

## 2024-09-17 PROCEDURE — 85025 COMPLETE CBC W/AUTO DIFF WBC: CPT

## 2024-09-17 PROCEDURE — 99284 EMERGENCY DEPT VISIT MOD MDM: CPT

## 2024-09-17 PROCEDURE — 76942 ECHO GUIDE FOR BIOPSY: CPT

## 2024-09-17 PROCEDURE — 19000 PUNCTURE ASPIR CYST BREAST: CPT

## 2024-09-17 RX ORDER — DOXYCYCLINE MONOHYDRATE 100 MG
1 TABLET ORAL
Qty: 14 | Refills: 0
Start: 2024-09-17 | End: 2024-09-23

## 2024-09-17 RX ORDER — KETOROLAC TROMETHAMINE 30 MG/ML
15 INJECTION, SOLUTION INTRAMUSCULAR ONCE
Refills: 0 | Status: DISCONTINUED | OUTPATIENT
Start: 2024-09-17 | End: 2024-09-17

## 2024-09-17 RX ADMIN — KETOROLAC TROMETHAMINE 15 MILLIGRAM(S): 30 INJECTION, SOLUTION INTRAMUSCULAR at 15:04

## 2024-09-17 NOTE — ED PROVIDER NOTE - PATIENT PORTAL LINK FT
You can access the FollowMyHealth Patient Portal offered by [facility] by registering at the following website: [email or web address]. By joining [facility]’s FollowMyHealth portal, you will also be able to view your health information using other applications (apps) compatible with our system.

## 2024-09-17 NOTE — ED PROVIDER NOTE - NSFOLLOWUPINSTRUCTIONS_ED_ALL_ED_FT
Take antibiotics as prescribed  Return to ED in 2 days for a wound check    Abscess    An abscess is an infected area that contains a collection of pus and debris. It can occur in almost any part of the body and occurs when the tissue gets infection. Symptoms include a painful mass that is red, warm, tender that might break open and HAVE drainage. If your health care provider gave you antibiotics make sure to take the full course and do not stop even if feeling better.     SEEK IMMEDIATE MEDICAL CARE IF YOU HAVE ANY OF THE FOLLOWING SYMPTOMS: chills, fever, muscle aches, or red streaking from the area.

## 2024-09-17 NOTE — ED PROVIDER NOTE - PHYSICAL EXAMINATION
CONSTITUTIONAL: Well-appearing;  in no apparent distress.   HEAD: Normocephalic; atraumatic.   EYES: PERRL; EOM intact; conjunctiva and sclera clear  ENT: normal nose; no rhinorrhea; normal pharynx with no erythema or lesions.   NECK: Supple; non-tender;   CARDIOVASCULAR: rrr,  RESPIRATORY: Breathing easily;   SKIN: R inner lower breast- 5x5 cm area of induration, erythema, warmth and tenderness with 2cm circular small area of fluctuance

## 2024-09-17 NOTE — ED PROVIDER NOTE - NSDCPRINTRESULTS_ED_ALL_ED
Detail Level: Generalized General Sunscreen Counseling: I recommend a broad spectrum sunscreen with a SPF of 30 or higher. Sunscreens should be applied at least 15 minutes prior to expected sun exposure and then every 2 hours after that as long as sun exposure continues. If swimming or exercising sunscreen should be reapplied every 45 minutes to an hour after getting wet or sweating.   I also recommended a lip balm with a sunscreen as well. Sun protective clothing can be used in lieu of sunscreen but must be worn the entire time you are exposed to the sun's rays. Patient requests all Lab, Cardiology, and Radiology Results on their Discharge Instructions

## 2024-09-17 NOTE — ED ADULT NURSE NOTE - OBJECTIVE STATEMENT
59y F presents to ED c/o abscess. Reports it started as a blackhead months ago, slowly progressing to redness/swelling/warmth. Denies fever/chills, drainage, other acute sx at this time. Pt AAOx4, speaking in full and clear sentences, NAD at this time.

## 2024-09-17 NOTE — ED PROVIDER NOTE - OBJECTIVE STATEMENT
58 yo with pmh of HTN and DM c/o abscess under R breast x 4 days. States started as a blackhead about 1 month ago and progressively worsened, became painful over the weekend. Denies fever, chills, n/v. Last mammo a few years ago.

## 2024-09-17 NOTE — ED PROVIDER NOTE - CLINICAL SUMMARY MEDICAL DECISION MAKING FREE TEXT BOX
58 yo with pmh of HTN and DM c/o abscess under R breast x 4 days. States started as a blackhead about 1 month ago and progressively worsened, became painful over the weekend. Denies fever, chills, n/v. Afebrile.  R inner lower breast- 5x5 cm area of induration, erythema, warmth and tenderness with 2cm circular small area of fluctuance 60 yo with pmh of HTN and DM c/o abscess under R breast x 4 days. States started as a blackhead about 1 month ago and progressively worsened, became painful over the weekend. Denies fever, chills, n/v. Afebrile.  R inner lower breast- 5x5 cm area of induration, erythema, warmth and tenderness with 2cm circular small area of fluctuance. ultrasound showed + abscess, 3cc pus drained by IR, sent for culture. will dc with doxy, pt luz return in 2 days for a wound check

## 2024-09-19 ENCOUNTER — EMERGENCY (EMERGENCY)
Facility: HOSPITAL | Age: 59
LOS: 1 days | Discharge: HOME | End: 2024-09-19
Attending: EMERGENCY MEDICINE | Admitting: EMERGENCY MEDICINE
Payer: MEDICAID

## 2024-09-19 VITALS
TEMPERATURE: 99 F | SYSTOLIC BLOOD PRESSURE: 153 MMHG | HEIGHT: 65 IN | RESPIRATION RATE: 17 BRPM | DIASTOLIC BLOOD PRESSURE: 98 MMHG | WEIGHT: 265 LBS | OXYGEN SATURATION: 97 % | HEART RATE: 97 BPM

## 2024-09-19 VITALS
OXYGEN SATURATION: 98 % | HEART RATE: 79 BPM | DIASTOLIC BLOOD PRESSURE: 92 MMHG | RESPIRATION RATE: 18 BRPM | TEMPERATURE: 98 F | SYSTOLIC BLOOD PRESSURE: 163 MMHG

## 2024-09-19 LAB
ANION GAP SERPL CALC-SCNC: 10 MMOL/L — SIGNIFICANT CHANGE UP (ref 5–17)
APTT BLD: 34.7 SEC — SIGNIFICANT CHANGE UP (ref 24.5–35.6)
BASOPHILS # BLD AUTO: 0.05 K/UL — SIGNIFICANT CHANGE UP (ref 0–0.2)
BASOPHILS NFR BLD AUTO: 0.6 % — SIGNIFICANT CHANGE UP (ref 0–2)
BUN SERPL-MCNC: 8 MG/DL — SIGNIFICANT CHANGE UP (ref 7–23)
CALCIUM SERPL-MCNC: 9.8 MG/DL — SIGNIFICANT CHANGE UP (ref 8.4–10.5)
CHLORIDE SERPL-SCNC: 102 MMOL/L — SIGNIFICANT CHANGE UP (ref 96–108)
CO2 SERPL-SCNC: 28 MMOL/L — SIGNIFICANT CHANGE UP (ref 22–31)
CREAT SERPL-MCNC: 0.93 MG/DL — SIGNIFICANT CHANGE UP (ref 0.5–1.3)
EGFR: 71 ML/MIN/1.73M2 — SIGNIFICANT CHANGE UP
EOSINOPHIL # BLD AUTO: 0.1 K/UL — SIGNIFICANT CHANGE UP (ref 0–0.5)
EOSINOPHIL NFR BLD AUTO: 1.2 % — SIGNIFICANT CHANGE UP (ref 0–6)
GLUCOSE SERPL-MCNC: 162 MG/DL — HIGH (ref 70–99)
HCT VFR BLD CALC: 43.5 % — SIGNIFICANT CHANGE UP (ref 34.5–45)
HGB BLD-MCNC: 14.1 G/DL — SIGNIFICANT CHANGE UP (ref 11.5–15.5)
IMM GRANULOCYTES NFR BLD AUTO: 0.4 % — SIGNIFICANT CHANGE UP (ref 0–0.9)
INR BLD: 0.97 — SIGNIFICANT CHANGE UP (ref 0.85–1.18)
LACTATE SERPL-SCNC: 1.8 MMOL/L — SIGNIFICANT CHANGE UP (ref 0.5–2)
LYMPHOCYTES # BLD AUTO: 2.43 K/UL — SIGNIFICANT CHANGE UP (ref 1–3.3)
LYMPHOCYTES # BLD AUTO: 28.9 % — SIGNIFICANT CHANGE UP (ref 13–44)
MCHC RBC-ENTMCNC: 28 PG — SIGNIFICANT CHANGE UP (ref 27–34)
MCHC RBC-ENTMCNC: 32.4 GM/DL — SIGNIFICANT CHANGE UP (ref 32–36)
MCV RBC AUTO: 86.3 FL — SIGNIFICANT CHANGE UP (ref 80–100)
MONOCYTES # BLD AUTO: 0.49 K/UL — SIGNIFICANT CHANGE UP (ref 0–0.9)
MONOCYTES NFR BLD AUTO: 5.8 % — SIGNIFICANT CHANGE UP (ref 2–14)
NEUTROPHILS # BLD AUTO: 5.31 K/UL — SIGNIFICANT CHANGE UP (ref 1.8–7.4)
NEUTROPHILS NFR BLD AUTO: 63.1 % — SIGNIFICANT CHANGE UP (ref 43–77)
NON-GYNECOLOGICAL CYTOLOGY STUDY: SIGNIFICANT CHANGE UP
NRBC # BLD: 0 /100 WBCS — SIGNIFICANT CHANGE UP (ref 0–0)
PLATELET # BLD AUTO: 259 K/UL — SIGNIFICANT CHANGE UP (ref 150–400)
POTASSIUM SERPL-MCNC: 4.5 MMOL/L — SIGNIFICANT CHANGE UP (ref 3.5–5.3)
POTASSIUM SERPL-SCNC: 4.5 MMOL/L — SIGNIFICANT CHANGE UP (ref 3.5–5.3)
PROTHROM AB SERPL-ACNC: 11.1 SEC — SIGNIFICANT CHANGE UP (ref 9.5–13)
RBC # BLD: 5.04 M/UL — SIGNIFICANT CHANGE UP (ref 3.8–5.2)
RBC # FLD: 13.6 % — SIGNIFICANT CHANGE UP (ref 10.3–14.5)
SODIUM SERPL-SCNC: 140 MMOL/L — SIGNIFICANT CHANGE UP (ref 135–145)
WBC # BLD: 8.41 K/UL — SIGNIFICANT CHANGE UP (ref 3.8–10.5)
WBC # FLD AUTO: 8.41 K/UL — SIGNIFICANT CHANGE UP (ref 3.8–10.5)

## 2024-09-19 PROCEDURE — 85610 PROTHROMBIN TIME: CPT

## 2024-09-19 PROCEDURE — 87077 CULTURE AEROBIC IDENTIFY: CPT

## 2024-09-19 PROCEDURE — 80048 BASIC METABOLIC PNL TOTAL CA: CPT

## 2024-09-19 PROCEDURE — 85730 THROMBOPLASTIN TIME PARTIAL: CPT

## 2024-09-19 PROCEDURE — 99284 EMERGENCY DEPT VISIT MOD MDM: CPT | Mod: 25

## 2024-09-19 PROCEDURE — 87205 SMEAR GRAM STAIN: CPT

## 2024-09-19 PROCEDURE — 36415 COLL VENOUS BLD VENIPUNCTURE: CPT

## 2024-09-19 PROCEDURE — 87040 BLOOD CULTURE FOR BACTERIA: CPT

## 2024-09-19 PROCEDURE — 96375 TX/PRO/DX INJ NEW DRUG ADDON: CPT | Mod: XU

## 2024-09-19 PROCEDURE — 96374 THER/PROPH/DIAG INJ IV PUSH: CPT | Mod: XU

## 2024-09-19 PROCEDURE — 76642 ULTRASOUND BREAST LIMITED: CPT

## 2024-09-19 PROCEDURE — 99285 EMERGENCY DEPT VISIT HI MDM: CPT

## 2024-09-19 PROCEDURE — 87070 CULTURE OTHR SPECIMN AEROBIC: CPT

## 2024-09-19 PROCEDURE — 76642 ULTRASOUND BREAST LIMITED: CPT | Mod: 26,RT

## 2024-09-19 PROCEDURE — 10160 PNXR ASPIR ABSC HMTMA BULLA: CPT

## 2024-09-19 PROCEDURE — 85025 COMPLETE CBC W/AUTO DIFF WBC: CPT

## 2024-09-19 PROCEDURE — 83605 ASSAY OF LACTIC ACID: CPT

## 2024-09-19 RX ORDER — ACETAMINOPHEN 325 MG/1
650 TABLET ORAL ONCE
Refills: 0 | Status: COMPLETED | OUTPATIENT
Start: 2024-09-19 | End: 2024-09-19

## 2024-09-19 RX ORDER — VANCOMYCIN/0.9 % SOD CHLORIDE 1.75G/25
2000 PLASTIC BAG, INJECTION (ML) INTRAVENOUS ONCE
Refills: 0 | Status: COMPLETED | OUTPATIENT
Start: 2024-09-19 | End: 2024-09-19

## 2024-09-19 RX ORDER — SODIUM CHLORIDE 9 MG/ML
1000 INJECTION INTRAMUSCULAR; INTRAVENOUS; SUBCUTANEOUS ONCE
Refills: 0 | Status: COMPLETED | OUTPATIENT
Start: 2024-09-19 | End: 2024-09-19

## 2024-09-19 RX ORDER — KETOROLAC TROMETHAMINE 30 MG/ML
15 INJECTION, SOLUTION INTRAMUSCULAR ONCE
Refills: 0 | Status: DISCONTINUED | OUTPATIENT
Start: 2024-09-19 | End: 2024-09-19

## 2024-09-19 RX ORDER — CEFEPIME 2 G/1
2000 INJECTION, POWDER, FOR SOLUTION INTRAVENOUS ONCE
Refills: 0 | Status: COMPLETED | OUTPATIENT
Start: 2024-09-19 | End: 2024-09-19

## 2024-09-19 RX ADMIN — CEFEPIME 100 MILLIGRAM(S): 2 INJECTION, POWDER, FOR SOLUTION INTRAVENOUS at 15:36

## 2024-09-19 RX ADMIN — SODIUM CHLORIDE 1000 MILLILITER(S): 9 INJECTION INTRAMUSCULAR; INTRAVENOUS; SUBCUTANEOUS at 15:27

## 2024-09-19 RX ADMIN — KETOROLAC TROMETHAMINE 15 MILLIGRAM(S): 30 INJECTION, SOLUTION INTRAMUSCULAR at 15:27

## 2024-09-19 RX ADMIN — Medication 250 MILLIGRAM(S): at 16:12

## 2024-09-19 RX ADMIN — ACETAMINOPHEN 650 MILLIGRAM(S): 325 TABLET ORAL at 15:27

## 2024-09-19 NOTE — ED PROVIDER NOTE - NSFOLLOWUPINSTRUCTIONS_ED_ALL_ED_FT
Can take tylenol 650mg every 6hrs as needed for pain.    Can also take motrin 600mg every 6hrs as needed for pain (WARNING: Use may cause stomach issues/problems. Take with food. Prolonged use can also cause kidney issues.).      Stay well hydrated.    Continue to take antibiotics (doxycycline) as prescribed.     Take daily pictures of affected area to assess for worsening infection.    Follow up with primary doctor within 1-2 days.     Follow up with Dr. Flores as soon as possible.     Return to ER sooner for fevers, persistent vomit, worsening pain, worsening breathing, worsening lightheaded, spreading redness, worsening swelling.     Cellulitis    Cellulitis is a skin infection caused by bacteria. This condition occurs most often in the arms and lower legs but can occur anywhere over the body. Symptoms include redness, swelling, warm skin, tenderness, and chills/fever. If you were prescribed an antibiotic medicine, take it as told by your health care provider. Do not stop taking the antibiotic even if you start to feel better.    SEEK IMMEDIATE MEDICAL CARE IF YOU HAVE ANY OF THE FOLLOWING SYMPTOMS: worsening fever, red streaks coming from affected area, vomiting or diarrhea, or dizziness/lightheadedness.     Abscess    An abscess is an infected area that contains a collection of pus and debris. It can occur in almost any part of the body and occurs when the tissue gets infection. Symptoms include a painful mass that is red, warm, tender that might break open and HAVE drainage. If your health care provider gave you antibiotics make sure to take the full course and do not stop even if feeling better.     SEEK IMMEDIATE MEDICAL CARE IF YOU HAVE ANY OF THE FOLLOWING SYMPTOMS: chills, fever, muscle aches, or red streaking from the area.

## 2024-09-19 NOTE — CONSULT NOTE ADULT - SUBJECTIVE AND OBJECTIVE BOX
60yo Female pt with no PMHx or PSHx presenting to [facility] ED with 1d hx of subjective fevers, chills, R breast pain and swelling. Patient was recently seen in [facility] ED 2d prior for R breast abscess drainage measuring 2x1.3x1.8cm at 4 o'cloclk position 8 CMFN. Patient reports that abscess initially appeared as a blackhead in January 2024 and slowly progresssively grew until 5d ago when she noticed new pain at the site. In the ED at that time, 3cc of pus was aspirated under ultrasound guidance and patient was discharged home with 7d course of doxy PO. Abscess growing gram - rods and gram + cocci in pairs, negative for malingnant cells, Cxs [ ]. On the night of discharge, patient states she felt fever and chills at home for 1d duration which have since subsided but now the patient notices a new bump medial to aspiration site with redness, pain, and swelling.    In the ED, pt afebrile, nontachycardic, normotensive, and satting on RA. On exam, right breast with previous aspiration site steristrips in place, new medial redness, induration, and swelling. Labs including CBC and CMP unremarkable. R breast US showing medial 2.3 x1.7 x2.2cm complex fluid collection consistent with abscess.     PMH: Denies  PSHx: Denies  Medications: Denies  Allergies: NKDA  Social Hx:  Family Hx: Denies family hx of IBS, Crohn's, UC, or colon cancer.    T(C): 37.1 (09-19-24 @ 14:38), Max: 37.1 (09-19-24 @ 14:38)  HR: 97 (09-19-24 @ 14:38) (97 - 97)  BP: 153/98 (09-19-24 @ 14:38) (153/98 - 153/98)  RR: 17 (09-19-24 @ 14:38) (17 - 17)  SpO2: 97% (09-19-24 @ 14:38) (97% - 97%)    Physical Exam  General: AAOx3, NAD, laying comfortably in bed  Cardio: S1,S2, No MRG  Pulm: Nonlabored breathing  Abdomen:  Extremities: WWP, peripheral pulses appreciated      LABS:                     14.1   8.41  )-----------( 259      ( 19 Sep 2024 15:29 )             43.5   09-19  140  |  102  |  8   ----------------------------<  162[H]  4.5   |  28  |  0.93  Ca    9.8      19 Sep 2024 15:29  PT/INR - ( 19 Sep 2024 15:29 )   PT: 11.1 sec;   INR: 0.97     PTT - ( 19 Sep 2024 15:29 )  PTT:34.7 sec

## 2024-09-19 NOTE — ED PROVIDER NOTE - OBJECTIVE STATEMENT
59F PMH HTN/DM presents for wound check/pain. Pt initially in ED 2d ago w/ R breast pain/swelling for several days. Labs grossly wnl. Was sent for US which showed abscess, was drained by Radiology Dr. Flores. Culture growing GNR and GPC. Was dc'd on doxy which she didn't start until yesterday morning (has taken 2 doses so far). States that the pain is worsening in intensity and is also spreading. Has not taken any pain meds. States that she also had fevers for last 2 days, none today. No other systemic symptoms.   Denies SOB, CP, NVD, abd pain, urinary complaints.

## 2024-09-19 NOTE — ED ADULT NURSE NOTE - PRO INTERPRETER NEED 2
Chart reviewed day 4. D/c order noted. Pending isabelle po at lunch. Patient IPTA no DCP needs. Home with .  Hafsa Officer, RN English

## 2024-09-19 NOTE — ED ADULT TRIAGE NOTE - WEIGHT IN LBS
Patient called would like to know if his ammonia levels were checked in the last lab work. Patient can be reached at [phone].    264.9

## 2024-09-19 NOTE — PROCEDURE NOTE - GENERAL PROCEDURE DETAILS
Local anesthetic was administered. Using a 18 guage needle, approximately 4cc of purulent/sanguinous liquid was aspirated and sent for culture. Dressing consisted of steristrips

## 2024-09-19 NOTE — ED PROVIDER NOTE - PATIENT PORTAL LINK FT
You can access the FollowMyHealth Patient Portal offered by [facility] by registering at the following website: [email or web address]. By joining [facility]’s FollowMyHealth portal, you will also be able to view your health information using other applications (apps) compatible with our system.

## 2024-09-19 NOTE — ED PROVIDER NOTE - CARE PROVIDER_API CALL
Lewis Flores [place]  [facility]  [address] [place] [id number]  Phone: [phone]  Fax: [phone]  Follow Up Time:

## 2024-09-19 NOTE — CONSULT NOTE ADULT - ASSESSMENT
Recommendations:  Follow up wound Cxs  Strict return precautions  Continue PO doxycycline until completion of antibiotic course  Follow up with Dr. Lewis Flores in 1 week  [phone] [address] [address]

## 2024-09-19 NOTE — ED PROVIDER NOTE - CLINICAL SUMMARY MEDICAL DECISION MAKING FREE TEXT BOX
59F PMH HTN/DM presents for wound check/pain. Pt initially in ED 2d ago w/ R breast pain/swelling for several days. Labs grossly wnl. Was sent for US which showed abscess, was drained by Radiology Dr. Flores. Culture growing GNR and GPC. Was dc'd on doxy which she didn't start until yesterday morning (has taken 2 doses so far). States that the pain is worsening in intensity and is also spreading. Has not taken any pain meds. States that she also had fevers for last 2 days, none today. No other systemic symptoms.   Mild HTN, other vitals wnl. Exam as above.   ddx: Though pt has only taken 3 doses of doxy, there is clinical concern for new abscess, worsening infection. Possible failing PO abx. PCN allergy.   Repeat labs, rpt US, broaden abx, reassess.

## 2024-09-19 NOTE — ED PROVIDER NOTE - PHYSICAL EXAMINATION
QUYEN herring chaperone: Right breast:  there is small steri strip at inferomedial aspect of breast overlying aspiration site. medial to this is an area of 1cm fluctuance. There is ~6x6cm area of blanching erythema/warmth/induration to inferomedial breast. no discharge.     no LE edema, normal equal distal pulses, steady unassisted gait.

## 2024-09-19 NOTE — ED ADULT TRIAGE NOTE - CHIEF COMPLAINT QUOTE
Pt presents to ED C/O pain S/P R breast abscess drained on Tuesday by PA at [facility] ED. Denies Tylenol/ Motrin PTA. Prescribed oral antibiotics.

## 2024-09-19 NOTE — ED ADULT NURSE NOTE - OBJECTIVE STATEMENT
Pt is a 60 y/o female A&Ox4 in NAD ambulatory with steady gait c/o right breast swelling, pain. Pt seen recently in ED and had abscess drained to right breast. Pt denies fever/chills. Pt talking in clear, full sentences, respirations even and unlabored.

## 2024-09-19 NOTE — ED ADULT NURSE NOTE - CHIEF COMPLAINT QUOTE
Pt presents to ED C/O pain S/P R breast abscess drained on Tuesday by PA at [facility] ED. Denies Tylenol/ Motrin PTA. Prescribed oral antibiotics.

## 2024-09-19 NOTE — ED ADULT NURSE NOTE - NSFALLUNIVINTERV_ED_ALL_ED
Bed/Stretcher in lowest position, wheels locked, appropriate side rails in place/Call bell, personal items and telephone in reach/Instruct patient to call for assistance before getting out of bed/chair/stretcher/Non-slip footwear applied when patient is off stretcher/[place] to call system/Physically safe environment - no spills, clutter or unnecessary equipment/Purposeful proactive rounding/Room/bathroom lighting operational, light cord in reach

## 2024-09-19 NOTE — ED ADULT NURSE NOTE - NEURO MENTATION
Universal Instructions Sheet      Your procedure/Surgery is scheduled at [facility]:  [address]   on 2/2__ at 3:30pm__, please plan to arrive at 1:30pm__.    Please do NOT follow the arrival time on your live well mariah, it is incorrect and doing so may cause your procedure to be cancelled (we are working on the issue).    Free  service is available Monday through Friday starting at 8am until 3:30pm     If you have received the COVID vaccine, bring your vaccination card with you the day of your procedure.    Please bring your insurance card, 's license and a list of your medications, vitamins and supplements to the hospital, including the last dosage and time taken.    ON DAY OF PROCEDURE, CHECK IN AT:  Day Surgery - located on the 2nd floor. ([phone])    COVID Testing:  N/A    Other Testing needed:  CMP, MRSA, Type and screen, UA  On 1/27  At [facility] on:  [address], [place]   [phone]      Important Information:    Hibiclens Required    Please bathe or shower the evening before and morning of your procedure/surgery.  Avoid using lotions, creams, powders, make-up on your clean skin.    Remove all jewelry, earrings, body piercings and contact lenses before coming to the hospital.    You may wear you glasses, hearing aids and/or dentures to the hospital. Please bring a case as they will need to be removed prior to the procedure/surgery.    If your doctor mentioned that you may might spend the night, please bring a small overnight bag with toiletries and any personal items you may need.  If you use a CPAP machine and will be spending the night, bring your machine, tubing and mask.    Due to anesthesia, you will not be able to operate power tools, drink alcohol, or drive a vehicle for 24 hours after surgery.  You will not be able to walk home, use public transportation or take a cab, Uber or Lyft home.      *You must have a  responsible adult (18 or older) to drive you home and stay with you overnight following the procedure/surgery.*    Diet:    DO NOT eat any solid food after midnight prior to your procedure/surgery. This includes hard candy, gum and mints. Eating after midnight could cause a delay or cancellation of your procedure/surgery.    You may have clear liquids up until 4 hours before your procedure/surgery. This includes liquids that do not contain pulp, dairy or cloudiness. Clear liquids include beverages like water, apple juice, sprite/7Up, broth, Jello, black coffee, and tea.       Medication Instructions:    If you use a rescue inhaler for asthma, please bring it with you on the day of your procedure/surgery.    Starting now, STOP taking supplements, vitamins containing vitamin E, and fish oil.    STOP taking non-steroidal, anti-inflammatory drugs, otherwise known as NSAIDS per your surgeon's instructions.  Examples of NSAIDS are Aleve, ibuprofen, Motrin, Advil and Naproxen.            If there are any changes in your physical condition, such as cold, fever or infection, or you have specific questions regarding your procedure/surgery, please contact your surgeon directly.    For questions regarding these instructions, contact Pre-Admission Testing at [phone], Monday through Friday, 8 am - 4 pm.    On the day of your procedure, please bring in a copy of your complete up-to-date medication list, I.D., insurance card and COVID vaccine card (if you have been vaccinated).          REQUIRED INSTRUCTIONS PRESURGERY SHOWER/BATH   DO NOT: shave any part of the surgical area for at least 2 days prior to surgery.   Tiny skin cuts and abrasions from shaving in the surgical area can increase risk of infection.   DO NOT use lotions on skin before surgery.   DO use a fresh clean towel after each shower.   DO dress in clean clothes after each of your showers.   DO shower or bathe your whole body, including hair, on the night before  normal surgery.   DO shower again on the morning of surgery if time permits (do not wash hair during this shower)     GENERAL REQUIRED SHOWER/BATH INSTRUCTIONS (SOAP AND WATER)   1. Use warm, but not hot, water for shower/bath       NOTE: Showers are recommended. Showers are more effective than a bath for pre-operative skin cleaning   2. Use regular soap to remove dirt and germs from your skin   3. Do not scrub so vigorously that skin is abraded or scratched   4. Use clean towels to gently dry your skin after each shower/bath          Instructions for Preoperative Skin Cleansing Before Planned Surgical Procedure at [facility]   For your safety and to help prevent infection, please follow this:    REQUIRED additional antiseptic shower/bath.   ANTISEPTIC SHOWER/BATH PROCESS     e.g. HIBICLENS* or alternative chlorhexidine (CHG) containing skin antisepsis solutions   Chlorhexidine gluconate (2% - 4%) skin cleansing solutions are wash solutions used to kill germs on the skin. They are available over the counter (no prescription needed) at minimal cost at most pharmacies and drug stores.   *WHEN USING HIBICLENS* MAY CAUSE SHOWER AND BATHTUB SURFACES TO BECOME SLIPPERY*   Instructions for \"whole body\" shower or bath with chlorhexidine gluconate (CHG) antiseptic solution   1. After your required shower or bath, rinse thoroughly.   2. Step away from the stream of water, and thoroughly apply enough solution to cover skin below the neck (not head or face), within skin folds, and in hard to reach areas (e.g. the back of the knees, inside elbows, etc). Use your hands to apply and rub onto your skin without using a washcloth. Note: Solution will NOT suds up when applying   3. Rinse thoroughly.   4. Dry off with a clean towel.   5. Dress in clean clothes.   • DO NOT use on the head or face DO NOT use in the genital area   • DO NOT use on non-healing wounds DO NOT use in eyes, ears or mouth.   Making sure that your skin is clean  and ready for surgery at home is very important.   You can reduce the risk of infection by following the required instructions above.     email

## 2024-09-19 NOTE — ED PROVIDER NOTE - PROGRESS NOTE DETAILS
24-Aug-2018 [place]: Labs grossly wnl. US performed, not yet resulted but looks like abscess. Gen surg consulted ~1900. Will reassess.   Updated pt. [place]: Surgery drained at bedside (~5cc). Pt in ED for ~6hrs. Remains well appearing/stable w/o any systemic symptoms. Only took 3 doses of doxy. Surgery recommending outpt f/u. D/w pt possible, but lower suspicion failure of PO antibiotics.   Discussed importance of outpt follow up and return precautions. Clinically no indication for further emergent ED workup or hospitalization at this time. Stable for dc, outpt f/u.

## 2024-09-20 ENCOUNTER — NON-APPOINTMENT (OUTPATIENT)
Age: 59
End: 2024-09-20

## 2024-09-20 LAB
GRAM STN FLD: ABNORMAL
SPECIMEN SOURCE: SIGNIFICANT CHANGE UP

## 2024-09-21 DIAGNOSIS — E11.9 TYPE 2 DIABETES MELLITUS WITHOUT COMPLICATIONS: ICD-10-CM

## 2024-09-21 DIAGNOSIS — N61.1 ABSCESS OF THE BREAST AND NIPPLE: ICD-10-CM

## 2024-09-21 DIAGNOSIS — I10 ESSENTIAL (PRIMARY) HYPERTENSION: ICD-10-CM

## 2024-09-21 DIAGNOSIS — Z88.0 ALLERGY STATUS TO PENICILLIN: ICD-10-CM

## 2024-09-23 DIAGNOSIS — N61.1 ABSCESS OF THE BREAST AND NIPPLE: ICD-10-CM

## 2024-09-23 DIAGNOSIS — Z88.0 ALLERGY STATUS TO PENICILLIN: ICD-10-CM

## 2024-09-23 DIAGNOSIS — E66.9 OBESITY, UNSPECIFIED: ICD-10-CM

## 2024-09-23 DIAGNOSIS — E11.9 TYPE 2 DIABETES MELLITUS WITHOUT COMPLICATIONS: ICD-10-CM

## 2024-09-23 DIAGNOSIS — Z87.2 PERSONAL HISTORY OF DISEASES OF THE SKIN AND SUBCUTANEOUS TISSUE: ICD-10-CM

## 2024-09-23 DIAGNOSIS — I10 ESSENTIAL (PRIMARY) HYPERTENSION: ICD-10-CM

## 2024-09-23 LAB
CULTURE RESULTS: ABNORMAL
SPECIMEN SOURCE: SIGNIFICANT CHANGE UP

## 2024-09-24 ENCOUNTER — APPOINTMENT (OUTPATIENT)
Dept: BREAST CENTER | Facility: CLINIC | Age: 59
End: 2024-09-24

## 2024-09-24 VITALS
HEART RATE: 91 BPM | SYSTOLIC BLOOD PRESSURE: 155 MMHG | WEIGHT: 258 LBS | BODY MASS INDEX: 44.05 KG/M2 | HEIGHT: 64 IN | DIASTOLIC BLOOD PRESSURE: 89 MMHG

## 2024-09-24 DIAGNOSIS — Z78.9 OTHER SPECIFIED HEALTH STATUS: ICD-10-CM

## 2024-09-24 PROCEDURE — 76642 ULTRASOUND BREAST LIMITED: CPT | Mod: RT

## 2024-09-24 PROCEDURE — 76942 ECHO GUIDE FOR BIOPSY: CPT | Mod: RT,59

## 2024-09-24 PROCEDURE — 10160 PNXR ASPIR ABSC HMTMA BULLA: CPT

## 2024-09-24 PROCEDURE — 99204 OFFICE O/P NEW MOD 45 MIN: CPT | Mod: 25

## 2024-09-24 RX ORDER — CLINDAMYCIN HYDROCHLORIDE 300 MG/1
300 CAPSULE ORAL EVERY 6 HOURS
Qty: 40 | Refills: 0 | Status: ACTIVE | COMMUNITY
Start: 2024-09-24 | End: 1900-01-01

## 2024-09-25 LAB
CULTURE RESULTS: SIGNIFICANT CHANGE UP
CULTURE RESULTS: SIGNIFICANT CHANGE UP
SPECIMEN SOURCE: SIGNIFICANT CHANGE UP
SPECIMEN SOURCE: SIGNIFICANT CHANGE UP

## 2024-09-28 NOTE — HISTORY OF PRESENT ILLNESS
[FreeTextEntry1] : 59 year old female was self-referred who presents for initial evaluation regarding a right breast abscess. Patient states she was in [facility] on 9/17/24 and had a R breast abscess drained then on 9/19, another abscess occurred on the same breast that was drained (right breast) seen on R US as a 2.3cm complex fluid collection at R 4:00 8cmfn, corresponds to recurrent breast abscess that was subsequently drained and she was prescribed Doxycycline. She does not recall the last time breast imaging was performed prior to this ED visit, states it has been a couple of years. She endorses scheduling a B/L sMMG for 10/23/24 at [facility]. Patient denies palpable masses, nipple discharge, skin changes.  Denies prior breast surgeries or biopsies. Patient denies family history of breast cancer. Denies famhx of ovarian cancer. Patient has not had genetic testing performed.   Millie Lifetime Risk 6.7%

## 2024-09-28 NOTE — PAST MEDICAL HISTORY
[Menarche Age ____] : age at menarche was [unfilled] [Menopause Age____] : age at menopause was [unfilled] [Total Preg ___] : G[unfilled] [Live Births ___] : P[unfilled]  [Living ___] : Living: [unfilled] [FreeTextEntry6] : No [FreeTextEntry7] : Yes, pills in the past for 20 years. [FreeTextEntry8] : No

## 2024-09-28 NOTE — HISTORY OF PRESENT ILLNESS
[FreeTextEntry1] : 59 year old female was self-referred who presents for initial evaluation regarding a right breast abscess. Patient states she was in [facility] on 9/17/24 and had a R breast abscess drained then on 9/19, another abscess occurred on the same breast that was drained (right breast) seen on R US as a 2.3cm complex fluid collection at R 4:00 8cmfn, corresponds to recurrent breast abscess that was subsequently drained and she was prescribed Doxycycline. She does not recall the last time breast imaging was performed prior to this ED visit, states it has been a couple of years. She endorses scheduling a B/L sMMG for 10/23/24 at [facility]. Patient denies palpable masses, nipple discharge, skin changes.  Denies prior breast surgeries or biopsies. Patient denies family history of breast cancer. Denies famhx of ovarian cancer. Patient has not had genetic testing performed.   Millie Lifetime Risk 6.7%

## 2024-09-28 NOTE — DATA REVIEWED
[No studies available for review at this time.] : No studies available for review at this time. [FreeTextEntry1] : 9/19/24 ([facility]) R US: R 4:00 8cmfn is an avascular 2.3cm complex fluid collection consistent with an abscess. Corresponds to recurrent breast abscess. Follow Up: abscess drainage performed in the emergency room. If symptoms to not resolve, additional imaging in a dedicated breast imaging center should be performed to exclude the possibility of malignancy.

## 2024-09-28 NOTE — ASSESSMENT
[FreeTextEntry1] : 59 year old female presents for initial evaluation regarding a right breast abscess. Patient states she was in [facility] on 9/17/24 and had a R breast abscess drained then on 9/19, another abscess occurred on the same breast that was drained (right breast). She does not recall the last time breast imaging was performed, states it has been a couple of years, though she endorses scheduling a B/L sMMG for 10/23/24 at [facility].  ROQUE 6.7%. Patient with a right breast abscess on physical exam, similar in appearance to a ruptured blister at the inframammary fold with subsequent in-office US-guided FNA performed, aspirate sent for culture. Advised patient use warm moist compresses over the area and prescribed Clindamycin 300 mg 1 tab every 6 hours for 10 days. Patient advised to return for re-examination next week. Discussed patients recurring right breast abscess and possible benign etiology. Patient scheduled for annual screening mammogram in October. Patient verbalizes understanding and is in agreement with the plan.

## 2024-09-28 NOTE — DATA REVIEWED
[No studies available for review at this time.] : No studies available for review at this time. [FreeTextEntry1] : 9/19/24 ([facility]) R US: R 4:00 8cmfn is an avascular 2.3cm complex fluid collection consistent with an abscess. Corresponds to recurrent breast abscess. Follow Up: abscess drainage performed in the emergency room. If symptoms to not resolve, additional imaging in a dedicated breast imaging center should be performed to exclude the possibility of malignancy.

## 2024-09-28 NOTE — ASSESSMENT
[FreeTextEntry1] : 59 year old female presents for initial evaluation regarding a right breast abscess. Patient states she was in [facility] on 9/17/24 and had a R breast abscess drained then on 9/19, another abscess occurred on the same breast that was drained (right breast). She does not recall the last time breast imaging was performed, states it has been a couple of years, though she endorses scheduling a B/L sMMG for 10/23/24 at [facility].  ROQUE 6.7%. Patient with a right breast abscess on physical exam, similar in appearance to a ruptured blister at the inframammary fold with subsequent in-office US-guided FNA performed, aspirate sent for culture. Advised patient use warm moist compresses over the area and prescribed Clindamycin 300 mg 1 tab every 6 hours for 10 days. Patient advised to return for re-examination next week. Discussed patients recurring right breast abscess and possible benign etiology. Patient scheduled for annual screening mammogram in October. Patient verbalizes understanding and is in agreement with the plan.

## 2024-09-28 NOTE — DATA REVIEWED
[No studies available for review at this time.] : No studies available for review at this time. [FreeTextEntry1] : 9/19/24 ([facility]) R US: R 4:00 8cmfn is an avascular 2.3cm complex fluid collection consistent with an abscess. Corresponds to recurrent breast abscess. Follow Up: abscess drainage performed in the emergency room. If symptoms to not resolve, additional imaging in a dedicated breast imaging center should be performed to exclude the possibility of malignancy.

## 2024-09-28 NOTE — PHYSICAL EXAM
[Supple] : supple [No Supraclavicular Adenopathy] : no supraclavicular adenopathy [Examined in the supine and seated position] : examined in the supine and seated position [No dominant masses] : no dominant masses left breast [No Nipple Retraction] : no left nipple retraction [No Nipple Discharge] : no left nipple discharge [No Axillary Lymphadenopathy] : no left axillary lymphadenopathy [de-identified] : blistering at the inframammary fold about 4:00 6cmfn with small amount of clear fluid drainage

## 2024-09-28 NOTE — ASSESSMENT
[FreeTextEntry1] : 59 year old female presents for initial evaluation regarding a right breast abscess. Patient states she was in [facility] on 9/17/24 and had a R breast abscess drained then on 9/19, another abscess occurred on the same breast that was drained (right breast). She does not recall the last time breast imaging was performed, states it has been a couple of years, though she endorses scheduling a B/L sMMG for 10/23/24 at [facility].  ROQUE 6.7%. Patient with a right breast abscess on physical exam, similar in appearance to a ruptured blister at the inframammary fold with subsequent in-office US-guided FNA performed, aspirate sent for culture. Advised patient use warm moist compresses over the area and prescribed Clindamycin 300 mg 1 tab every 6 hours for 10 days. Patient advised to return for re-examination next week. Discussed patients recurring right breast abscess and possible benign etiology. Patient scheduled for annual screening mammogram in October. Patient verbalizes understanding and is in agreement with the plan.

## 2024-09-28 NOTE — PHYSICAL EXAM
Patient : Kathleen Galeano Age: 89 year old Sex: female   MRN: [id number] Encounter Date: 1/22/2017    3307/A    History     Chief Complaint   Patient presents with   • Fall   • Hip Injury     HPI History provided by EMS  1/22/2017  8:13 AM Kathleen Galeano is a 89 year old female who presents to the ED via EMS with R hip pain. Per EMS, the pt had an unwitnessed fall in the [facility]. The pt's son is at bedside, and states that she has a pacemaker, and uses a walker to get around. The pt's son also states that she has a h/o hip fx, although he is unsure of when this happened or to which hip. There are no further complaints or modifying factors at this time.    Family Contact info:   Son: Dom [phone].  Son: Patrice [phone].     PCP: Kapil Dailey MD      ALLERGIES:   Allergen Reactions   • Penicillins RASH   • Tetracycline    • Lisinopril Cough       Current Discharge Medication List      CONTINUE these medications which have NOT CHANGED    Details   fluticasone (FLOVENT HFA) 110 MCG/ACT inhaler Inhale 2 puffs into the lungs 2 times daily.      NYSTOP (NYSTOP) 435470 UNIT/GM Powder APPLY TOPICALLY TO AFFECTED AREAS THREE TIMES A DAY  Qty: 60 g, Refills: 5      ASPIR-LOW 81 MG EC tablet GIVE 1 TABLET BY MOUTH ONCE DAILY  Qty: 30 tablet, Refills: 5      acetaminophen (TYLENOL) 325 MG tablet Take 2 tablets by mouth every 4 hours as needed for Pain or Fever. Dose:  2 tablets (=650 mg)  Qty: 120 tablet, Refills: 5      albuterol-ipratropium 2.5 mg/0.5 mg (DUONEB) 0.5-2.5 (3) MG/3ML nebulizer solution INHALE 3 ML (=2.5 MG ALBUT/0.5 MG IPRAT) VIA HHN EVERY 6 HOURS AS NEEDED  Qty: 90 mL, Refills: 0    Associated Diagnoses: Mild intermittent asthma without complication      escitalopram (LEXAPRO) 10 MG tablet GIVE 1 TABLET BY MOUTH ONCE DAILY  Qty: 30 tablet, Refills: 5      Sennosides (SENNA) 8.6 MG Tab GIVE 1 TABLET BY MOUTH EVERY MORNING W/BREAKFAST FOR STOOL SOFTNER  Qty: 90 tablet, Refills: 0       triamterene-hydrochlorothiazide (DYAZIDE) 37.5-25 MG per capsule GIVE 1 CAPSULE BY MOUTH EVERY MORNING FOR BLOOD PRESSURES  Qty: 90 capsule, Refills: 1      donepezil (ARICEPT) 10 MG tablet GIVE 1 TABLET BY MOUTH AT BEDTIME FOR MEMORY  Qty: 90 tablet, Refills: 2      ALL DAY RELIEF 220 MG tablet GIVE 1 TABLET BY MOUTH TWICE DAILY W/MEALS FOR ARTHRITIS  Qty: 180 tablet, Refills: 3      Fe-Succ Ac-C-Thre Ac (FERREX 150 PLUS) 150-50-50 MG Cap GIVE 1 CAPSULE BY MOUTH EVERY EVENING W/FOOD FOR SUPPLEMENT  Qty: 30 capsule, Refills: 1      omeprazole (PRILOSEC) 20 MG capsule GIVE 1 CAPSULE BY MOUTH ONCE DAILY FOR ACID REFLUX  Qty: 90 capsule, Refills: 5      Pseudoephedrine-DM-GG (QC TUSSIN COLD/COUGH PO) Take 10 mLs by mouth as needed. Indications: cough             Past Medical History   Diagnosis Date   • ASHD    • Asthma    • Complete heart block October 2013     PPM   • Degenerative joint disease      C5-6  and C6-7   • Depression    • h/o myocardial infarction 01/01/2002   • Hip fracture, intertrochanteric 4/12/12   • Hypertension    • Rheumatoid arthritis(714.0)    • Senile dementia, uncomplicated    • Type II or unspecified type diabetes mellitus without mention of complication, not stated as uncontrolled    • Urinary incontinence    • Urinary tract infection        Past Surgical History   Procedure Laterality Date   • Remv cataract extracap insert lens  02/27/2007     Right Cataract Removal Lens Implant   • Myocardl perf rest stress  09/12/2005     Normal Myocardial Perfusion   • Dexa bone density axial skeleton  07/12/2006     Normal BMD   • Hip arthroplasty  04/13/2012   • Pacemaker  10/11/2013     [facility] complete heart block       Family History   Problem Relation Age of Onset   • Heart Father      CHF       Social History   Substance Use Topics   • Smoking status: Former Smoker     Types: Cigarettes     Quit date: 1/1/1982   • Smokeless tobacco: Former User     Quit date: 4/12/1985      Comment:  History of 40 pack years   • Alcohol use 2.9 - 3.5 oz/week     4 - 5 Glasses of wine, 1 Standard drinks or equivalent per week       Review of Systems   Constitutional: Negative for appetite change and diaphoresis.   HENT: Negative for ear pain and trouble swallowing.    Eyes: Negative for pain and redness.   Respiratory: Negative for apnea and stridor.    Cardiovascular: Negative for chest pain.   Gastrointestinal: Negative for nausea and vomiting.   Genitourinary: Negative for difficulty urinating and dysuria.   Musculoskeletal: Positive for arthralgias (right hip pain). Negative for myalgias.   Skin: Negative for pallor.   Neurological: Negative for light-headedness.   Psychiatric/Behavioral: Negative for confusion.       Physical Exam       ED Triage Vitals   ED Triage Vitals Group      Temp 01/22/17 0815 98.2 °F (36.8 °C)      Pulse 01/22/17 0815 84      Resp 01/22/17 0815 18      BP 01/22/17 0815 134/79      SpO2 01/22/17 0815 89 %      EtCO2 mmHg --       Height 01/22/17 0815 5' 5\" (1.651 m)      Weight 01/22/17 0815 150 lb (68 kg)      Weight Scale Used 01/22/17 0815 ED Stated       Physical Exam   Constitutional: No distress.   HENT:   Head: Normocephalic and atraumatic.   Eyes: Pupils are equal, round, and reactive to light. Right eye exhibits no discharge. Left eye exhibits no discharge.   Neck: No JVD present.   Cardiovascular: Normal rate, regular rhythm and normal heart sounds.    Pulses:       Dorsalis pedis pulses are 2+ on the right side, and 2+ on the left side.   Pulmonary/Chest: She is in respiratory distress (moderate to severe).   Bilateral diminished breath sounds in the lung bases.     Pacemaker in the left infraclavicular area.    Abdominal: Soft. Bowel sounds are normal.   Musculoskeletal: She exhibits no edema.   Right lower extremity is shortened and externally rotated.    Neurological: GCS eye subscore is 4. GCS verbal subscore is 5. GCS motor subscore is 6.   Alert & Orientated X 2    Skin: Skin is warm and dry.   Psychiatric: She has a normal mood and affect.   Nursing note and vitals reviewed.        ED Course     Procedures    Lab Results     Results for orders placed or performed during the hospital encounter of 01/22/17   CBC & Auto Differential   Result Value Ref Range    WBC 8.0 4.2 - 11.0 K/mcL    RBC 4.91 4.00 - 5.20 mil/mcL    HGB 13.7 12.0 - 15.5 g/dL    HCT 42.7 36.0 - 46.5 %    MCV 87.0 78.0 - 100.0 fl    MCH 27.9 26.0 - 34.0 pg    MCHC 32.1 32.0 - 36.5 g/dL    RDW-CV 16.3 (H) 11.0 - 15.0 %     140 - 450 K/mcL    DIFF TYPE AUTOMATED DIFFERENTIAL     Neutrophil 81 %    LYMPH 7 %    MONO 12 %    EOSIN 0 %    BASO 0 %    Absolute Neutrophil 6.5 1.8 - 7.7 K/mcL    Absolute Lymph 0.6 (L) 1.0 - 4.0 K/mcL    Absolute Mono 1.0 (H) 0.3 - 0.9 K/mcL    Absolute Eos 0.0 (L) 0.1 - 0.5 K/mcL    Absolute Baso 0.0 0.0 - 0.3 K/mcL   Basic Metabolic Panel   Result Value Ref Range    Sodium 141 135 - 145 mmol/L    Potassium 4.1 3.4 - 5.1 mmol/L    Chloride 104 98 - 107 mmol/L    Carbon Dioxide 30 21 - 32 mmol/L    Anion Gap 11 10 - 20 mmol/L    Glucose 192 (H) 65 - 99 mg/dL    BUN 26 (H) 10 - 20 mg/dL    Creatinine 1.05 (H) 0.51 - 0.95 mg/dL    GFR Estimate,  54     GFR Estimate, Non African American 47     BUN/Creatinine Ratio 25 7 - 25    CALCIUM 8.7 8.4 - 10.2 mg/dL   Urinalysis & Reflex Micro with Culture if Indicated   Result Value Ref Range    COLOR YELLOW YELLOW    APPEARANCE CLEAR     GLUCOSE(URINE) NEGATIVE NEGATIVE mg/dL    BILIRUBIN NEGATIVE NEGATIVE    KETONES NEGATIVE NEGATIVE mg/dL    SPECIFIC GRAVITY >1.030 (H) 1.005 - 1.030    BLOOD NEGATIVE NEGATIVE    pH 6.0 5.0 - 7.0 Units    PROTEIN(URINE) 30 (A) NEGATIVE mg/dL    UROBILINOGEN 0.2 0.0 - 1.0 mg/dL    NITRITE NEGATIVE NEGATIVE    LEUKOCYTE ESTERASE NEGATIVE NEGATIVE    SPECIMEN TYPE URINE, CATHETERIZED, CARTWRIGHT    Troponin I - Point of Care   Result Value Ref Range    Troponin I POC <0.10 <0.10 ng/mL    Urinalysis Microscopic   Result Value Ref Range    Squamous EPI'S 11 to 25 0 - 5 /hpf    RBC 1 to 3 0 - 3 /hpf    WBC 1 to 5 0 - 5 /hpf    BACTERIA FEW (A) NONE SEEN /hpf    Hyaline Casts NONE SEEN 0 - 5 /lpf    MUCOUS PRESENT        EKG Results     None.    Radiology Results     Imaging Results         XR CHEST AP OR PA - PORTABLE (Final result) Result time:  01/22/17 09:00:47    Final result    Impression:    IMPRESSION:    1. Stable chest, without evidence for acute cardiopulmonary disease.      Narrative:    EXAM: SINGLE-VIEW CHEST    EXAM TIME: 0844 hours.    TECHNIQUE: Portable supine.    INDICATION: hip fracture pathway.    COMPARISON: 8/19/2016.    FINDINGS:      The heart is normal in size. Left pacemaker is in place. The pulmonary  vasculature is within normal limits. Moderate size hiatal hernia is  present. The left costophrenic sulcus is chronically blunted. The lungs are  clear. No pleural effusions are seen. There is no evidence for  pneumothorax.              XR Pelvis 1 View (Final result) Result time:  01/22/17 09:03:00    Final result    Impression:    IMPRESSION:     1. Comminuted intertrochanteric fracture of the right hip, with varus  deformity.      Narrative:    XR HIP 2 VW RIGHT, XR PELVIS 1 OR 2 VW    INDICATION: fall, shortened externally rotated right hip    COMPARISON: None.    FINDINGS: A comminuted intertrochanteric fracture involves the right hip.  Varus deformity is present.    No additional pelvic fractures are evident. Vertebroplasty/kyphoplasty  changes are present at L3. Osseous structures are diffusely demineralized.  Remote left intertrochanteric fracture is stabilized by hardware.              XR Hip Right AP and Lateral (Final result) Result time:  01/22/17 09:03:00    Final result    Impression:    IMPRESSION:     1. Comminuted intertrochanteric fracture of the right hip, with varus  deformity.      Narrative:    XR HIP 2 VW RIGHT, XR PELVIS 1 OR 2 VW    INDICATION: fall,  shortened externally rotated right hip    COMPARISON: None.    FINDINGS: A comminuted intertrochanteric fracture involves the right hip.  Varus deformity is present.    No additional pelvic fractures are evident. Vertebroplasty/kyphoplasty  changes are present at L3. Osseous structures are diffusely demineralized.  Remote left intertrochanteric fracture is stabilized by hardware.                ED Medication Orders     Start Ordered     Status Ordering Provider    01/22/17 0915 01/22/17 0911  fentaNYL (SUBLIMAZE) injection 25 mcg  ONCE      Last MAR action:  Given JASON WEINBERG    01/22/17 0820 01/22/17 0821    EVERY 10 MIN PRN,   Status:  Discontinued      Discontinued JASON WEINBERG    01/22/17 0818 01/22/17 0821  lidocaine (UROJET) 2 % jelly 10 mL  (ED Insert Han Panel)  ONCE PRN      Acknowledged JASON WEINBERG          [facility]    Vitals  Vitals:    01/22/17 0818 01/22/17 0859 01/22/17 0900 01/22/17 0956   BP: 134/79  118/81 108/66   Pulse: 82 86 88 88   Resp: 22 22 21 18   Temp:    97.4 °F (36.3 °C)   TempSrc:    Oral   SpO2: 93% 97% 97% 97%   Weight:    69.6 kg   Height:    5' 5\" (1.651 m)       ED Course    9:01 AM I spoke with Dr. Umesh Doherty (Orthopedics) regarding the patient's presentation, and the ED work up. We further discussed the pt's XR results and Dr. Doherty recommended the pt receive surgery for her intertrochanteric R femur fracture. We collaboratively agreed upon further care in the hospital.     9:03 AM I rechecked on the patient. She is sleeping, and her sons are at bedside. I discussed with them the XR findings which revealed a R hip fracture, and informed them of Dr. Doherty's recommendation for orthopedic surgery. Per sons, the pt's last meal time was last night. Her sons report the pt's code status of DNR. The pt's sons understands and agrees with the plan. All questions were answered.     9:14 AM I spoke with Dr. Humberto Kenny (Hospitalist) regarding the patient's presentation of an  intertrochanteric right femur fracture, and the ED work up. I informed Dr. Kenny of the normal results of the pt's last stress test in Sept 2015. I also mentioned the pt's DNR code status. We further discussed and collaboratively agreed upon further care in the hospital.        MDM:    Patient presents with right hip pain after a fall.  DDx includes hip fracture, hip dislocation, femur fracture, pelvis fracture.  X-ray revealed intertrochanteric fracture with comminution and displacement.  Orthopedic surgery was consulted.  Patient was admitted to the hospitalist.      Critical Care time spent on this patient outside of billable procedures: None.     Clinical Impression  ED Diagnoses        Final diagnoses    Fracture, intertrochanteric, right femur, closed, initial encounter     Fall, initial encounter           Pt to be admitted to Dr. Humberto Kenny (Hospitalist) in stable condition.    _______________________________________________________________      I have reviewed the information recorded by the scribe for accuracy and agree with its contents.    ____________________________________________________________________    Ludwin Lin acting as a scribe for Dr. Trevor Fuchs  Dictation #[id number]  Scribe: Ludwin Fuchs, DO  01/22/17 1022     [Supple] : supple [No Supraclavicular Adenopathy] : no supraclavicular adenopathy [Examined in the supine and seated position] : examined in the supine and seated position [No dominant masses] : no dominant masses left breast [No Nipple Retraction] : no left nipple retraction [No Nipple Discharge] : no left nipple discharge [No Axillary Lymphadenopathy] : no left axillary lymphadenopathy [de-identified] : blistering at the inframammary fold about 4:00 6cmfn with small amount of clear fluid drainage

## 2024-09-28 NOTE — PROCEDURE
[FreeTextEntry1] : Right breast US-guided FNA [FreeTextEntry2] : right breast abscess [FreeTextEntry3] : Technique: The patient was prepped and draped, cleansed with alcohol, ultrasound gel applied, intended site of aspiration was located, using a 25G needle a total of 5cc of 1% lidocaine was injected for local anesthetic, using ultrasound guidance, an 18G needle was introduced into site of abscess with a total of 5cc of serosanguinous fluid aspirated. Sent aspirate for culture/sensitivity. The patient tolerated the procedure well, no complications.

## 2024-09-28 NOTE — PHYSICAL EXAM
[Supple] : supple [No Supraclavicular Adenopathy] : no supraclavicular adenopathy [Examined in the supine and seated position] : examined in the supine and seated position [No dominant masses] : no dominant masses left breast [No Nipple Retraction] : no left nipple retraction [No Nipple Discharge] : no left nipple discharge [No Axillary Lymphadenopathy] : no left axillary lymphadenopathy [de-identified] : blistering at the inframammary fold about 4:00 6cmfn with small amount of clear fluid drainage

## 2024-09-28 NOTE — HISTORY OF PRESENT ILLNESS
[FreeTextEntry1] : 59 year old female was self-referred who presents for initial evaluation regarding a right breast abscess. Patient states she was in [facility] on 9/17/24 and had a R breast abscess drained then on 9/19, another abscess occurred on the same breast that was drained (right breast) seen on R US as a 2.3cm complex fluid collection at R 4:00 8cmfn, corresponds to recurrent breast abscess that was subsequently drained and she was prescribed Doxycycline. She does not recall the last time breast imaging was performed prior to this ED visit, states it has been a couple of years. She endorses scheduling a B/L sMMG for 10/23/24 at [facility]. Patient denies palpable masses, nipple discharge, skin changes.  Denies prior breast surgeries or biopsies. Patient denies family history of breast cancer. Denies famhx of ovarian cancer. Patient has not had genetic testing performed.   Millie Lifetime Risk 6.7%

## 2024-09-30 LAB — BACTERIA WND CULT: ABNORMAL

## 2024-09-30 NOTE — ASSESSMENT
[FreeTextEntry1] : 59 year old female presents for follow up evaluation regarding a right breast abscess. Patient states she was in [facility] on 9/17/24 and had a R breast abscess drained then on 9/19, another abscess occurred on the same breast that was drained (right breast). She does not recall the last time breast imaging was performed, states it has been a couple of years, though she endorses scheduling a B/L sMMG for 10/23/24 at [facility].  At [facility] one week ago, patient underwent FNA of the right breast abscess and culture revealed moderate polymorphonuclear leukocytes and rare gram positive cocci in pairs. Patient currently on day ????/10 of Clindamycin.  ROQUE 6.7%.   ***FROM CF:  Patient with a right breast abscess on physical exam, similar in appearance to a ruptured blister at the inframammary fold. Advised patient use warm moist compresses over the area and prescribed Clindamycin 300 mg 1 tab every 6 hours for 10 days.   Patient scheduled for annual screening mammogram in October. Patient verbalizes understanding and is in agreement with the plan.

## 2024-09-30 NOTE — DATA REVIEWED
[FreeTextEntry1] : 9/19/24 ([facility]) R US: R 4:00 8cmfn is an avascular 2.3cm complex fluid collection consistent with an abscess. Corresponds to recurrent breast abscess. Follow Up: abscess drainage performed in the emergency room. If symptoms to not resolve, additional imaging in a dedicated breast imaging center should be performed to exclude the possibility of malignancy.  9/24/24 ([facility]) Culture- R breast Abscess: Moderate polymorphonuclear leukocytes per low power field. Rare Gram positive cocci in pairs per oil power field.

## 2024-09-30 NOTE — PHYSICAL EXAM
[Supple] : supple [No Supraclavicular Adenopathy] : no supraclavicular adenopathy [Examined in the supine and seated position] : examined in the supine and seated position [No dominant masses] : no dominant masses left breast [No Nipple Retraction] : no left nipple retraction [No Nipple Discharge] : no left nipple discharge [No Axillary Lymphadenopathy] : no left axillary lymphadenopathy [de-identified] : blistering at the inframammary fold about 4:00 6cmfn with small amount of clear fluid drainage

## 2024-09-30 NOTE — HISTORY OF PRESENT ILLNESS
[FreeTextEntry1] : 59 year old female presents for follow up evaluation regarding a right breast abscess. Patient stated she was in [facility] on 9/17/24 and had a R breast abscess drained then on 9/19, another abscess occurred on the same breast that was drained seen on R US as a 2.3cm complex fluid collection at R 4:00 8cmfn, corresponds to recurrent breast abscess that was subsequently drained and she was prescribed Doxycycline.   At [facility] one week ago, patient underwent FNA of the right breast abscess and culture revealed moderate polymorphonuclear leukocytes and rare gram positive cocci in pairs. Patient currently on day ????/10 of Clindamycin.  She does not recall the last time breast imaging was performed prior to this ED visit, states it has been a couple of years. She endorses scheduling a B/L sMMG for 10/23/24 at [facility]. Patient denies palpable masses, nipple discharge, skin changes.  Denies prior breast surgeries or biopsies. Patient denies family history of breast cancer. Denies famhx of ovarian cancer. Patient has not had genetic testing performed.   Millie Lifetime Risk 6.7%

## 2024-10-01 ENCOUNTER — APPOINTMENT (OUTPATIENT)
Dept: BREAST CENTER | Facility: CLINIC | Age: 59
End: 2024-10-01

## 2024-10-01 DIAGNOSIS — N61.1 ABSCESS OF THE BREAST AND NIPPLE: ICD-10-CM

## 2024-10-08 ENCOUNTER — EMERGENCY (EMERGENCY)
Facility: HOSPITAL | Age: 59
LOS: 1 days | Discharge: HOME | End: 2024-10-08
Attending: EMERGENCY MEDICINE | Admitting: EMERGENCY MEDICINE
Payer: MEDICAID

## 2024-10-08 VITALS
SYSTOLIC BLOOD PRESSURE: 134 MMHG | DIASTOLIC BLOOD PRESSURE: 78 MMHG | TEMPERATURE: 98 F | HEART RATE: 78 BPM | RESPIRATION RATE: 16 BRPM | OXYGEN SATURATION: 98 %

## 2024-10-08 VITALS
HEIGHT: 65 IN | RESPIRATION RATE: 18 BRPM | SYSTOLIC BLOOD PRESSURE: 138 MMHG | TEMPERATURE: 98 F | DIASTOLIC BLOOD PRESSURE: 82 MMHG | WEIGHT: 253.09 LBS | HEART RATE: 89 BPM | OXYGEN SATURATION: 97 %

## 2024-10-08 PROCEDURE — 99283 EMERGENCY DEPT VISIT LOW MDM: CPT

## 2024-10-08 PROCEDURE — 99284 EMERGENCY DEPT VISIT MOD MDM: CPT

## 2024-10-08 RX ORDER — DOXYCYCLINE HYCLATE 100 MG
100 CAPSULE ORAL ONCE
Refills: 0 | Status: COMPLETED | OUTPATIENT
Start: 2024-10-08 | End: 2024-10-08

## 2024-10-08 RX ORDER — ACETAMINOPHEN 325 MG
650 TABLET ORAL ONCE
Refills: 0 | Status: COMPLETED | OUTPATIENT
Start: 2024-10-08 | End: 2024-10-08

## 2024-10-08 RX ORDER — DOXYCYCLINE HYCLATE 100 MG
1 CAPSULE ORAL
Qty: 20 | Refills: 0
Start: 2024-10-08 | End: 2024-10-17

## 2024-10-08 RX ADMIN — Medication 650 MILLIGRAM(S): at 16:22

## 2024-10-08 RX ADMIN — Medication 650 MILLIGRAM(S): at 16:17

## 2024-10-08 RX ADMIN — Medication 100 MILLIGRAM(S): at 16:16

## 2024-10-08 NOTE — ED ADULT TRIAGE NOTE - AS TEMP SITE
[No studies available for review at this time.] : No studies available for review at this time. oral PROVIDER:[TOKEN:[[id number]]]

## 2024-10-08 NOTE — ED ADULT TRIAGE NOTE - CHIEF COMPLAINT QUOTE
Pt presents to ED c/o wound check on her keloids on the lower abdominal area. Denies fever, chills, CP or SOB. Pt A&Ox4, NAD.

## 2024-10-08 NOTE — ED PROVIDER NOTE - CHIEF COMPLAINT
Discharge Instructions  Laceration (Cut)    You were seen today for a laceration (cut).  Your provider examined your laceration for any problems such a buried foreign body (like glass, a splinter, or gravel), or injury to blood vessels, tendons, and nerves.  Your provider may have also rinsed and/or scrubbed your laceration to help prevent an infection. It may not be possible to find all problems with your laceration on the first visit; occasionally foreign bodies or a tendon injury can go undetected.    Your laceration may have been closed in one of several ways:    No closure: many wounds will heal just fine without closure.    Stitches: regular stitches that require removal.    Staples: skin staples are often used in the scalp/head.    Wound adhesive (glue): skin glue can be used for certain lacerations and doesn t require removal.    Wound strips (aka Butterfly bandages or steri-strips): these are bandages that help to close a wound.    Absorbable stitches:  dissolving  stitches that go away on their own and usually don t require removal.    A small percentage of wounds will develop an infection regardless of how well the wound is cared for. Antibiotics are generally not indicated to prevent an infection so are only given for a small number of high-risk wounds. Some lacerations are too high risk to close, and are left open to heal because closure can increase the likelihood that an infection will develop.    Remember that all lacerations, no matter how expertly repaired, will cause scarring. We consider many factors, techniques, and materials, in our efforts to provide the best possible cosmetic outcome.    Generally, every Emergency Department visit should have a follow-up clinic visit with either a primary or a specialty clinic/provider. Please follow-up as instructed by your emergency provider today.     Return to the Emergency Department right away if:    You have more redness, swelling, pain, drainage  (pus), a bad smell, or red streaking from your laceration as these symptoms could indicate an infection.    You have a fever of 100.4 F or more.    You have bleeding that you cannot stop at home. If your cut starts to bleed, hold pressure on the bleeding area with a clean cloth or put pressure over the bandage.  If the bleeding does not stop after using constant pressure for 30 minutes, you should return to the Emergency Department for further treatment.    An area past the laceration is cool, pale, or blue compared with the other side, or has a slower return of color when squeezed.    Your dressing seems too tight or starts to get uncomfortable or painful. For children, signs of a problem might be irritability or restlessness.    You have loss of normal function or use of an area, such as being unable to straighten or bend a finger normally.    You have a numb area past the laceration.    Return to the Emergency Department or see your regular provider if:    The laceration starts to come open.     You have something coming out of the cut or a feeling that there is something in the laceration.    Your wound will not heal, or keeps breaking open. There can always be glass, wood, dirt or other things in any wound.  They will not always show up, even on x-rays.  If a wound does not heal, this may be why, and it is important to follow-up with your regular provider.    Home Care:    Take your dressing off in 12-24 hours, or as instructed by your provider, to check your laceration. Remove the dressing sooner if it seems too tight or painful, or if it is getting numb, tingly, or pale past the dressing.    Gently wash your laceration 1-2 times daily with clean water and mild soap. It is okay to shower or run clean water over the laceration, but do not let the laceration soak in water (no swimming).    If your laceration was closed with wound adhesive or strips: pat it dry and leave it open to the air. For all other repairs:  after you wash your laceration, or at least 2 times a day, apply antibiotic ointment (such as Neosporin  or Bacitracin ) to the laceration, then cover it with a Band-Aid  or gauze.    Keep the laceration clean. Wear gloves or other protective clothing if you are around dirt.    Follow-up for removal:    If your wound was closed with staples or regular stitches, they need to be removed according to the instructions and timeline specified by your provider today.    If your wound was closed with absorbable ( dissolving ) sutures, they should fall out, dissolve, or not be visible in about one week. If they are still visible, then they should be removed according to the instructions and timeline specified by your provider today.    Scars:  To help minimize scarring:    Wear sunscreen over the healed laceration when out in the sun.    Massage the area regularly once healed.    You may apply Vitamin E to the healed wound.    Wait. Scars improve in appearance over months and years.    If you were given a prescription for medicine here today, be sure to read all of the information (including the package insert) that comes with your prescription.  This will include important information about the medicine, its side effects, and any warnings that you need to know about.  The pharmacist who fills the prescription can provide more information and answer questions you may have about the medicine.  If you have questions or concerns that the pharmacist cannot address, please call or return to the Emergency Department.       Remember that you can always come back to the Emergency Department if you are not able to see your regular provider in the amount of time listed above, if you get any new symptoms, or if there is anything that worries you.    Discharge Instructions  Head Injury    You have been seen today for a head injury. Your evaluation included a history and physical examination. You may have had a CT (CAT) scan performed,  though most head injuries do not require a scan. Based on this evaluation, your provider today does not feel that your head injury is serious.    Generally, every Emergency Department visit should have a follow-up clinic visit with either a primary or a specialty clinic/provider. Please follow-up as instructed by your emergency provider today.  Return to the Emergency Department if:    You are confused or you are not acting right.    Your headache gets worse or you start to have a really bad headache even with your recommended treatment plan.    You vomit (throw up) more than once.    You have a seizure.    You have trouble walking.    You have weakness or paralysis (cannot move) in an arm or a leg.    You have blood or fluid coming from your ears or nose.    You have new symptoms or anything that worries you.    Sleeping:  It is okay for you to sleep, but someone should wake you up if instructed by your provider, and someone should check on you at your usual time to wake up.     Activity:    Do not drive for at least 24 hours.    Do not drive if you have dizzy spells or trouble concentrating, or remembering things.    Do not return to any contact sports until cleared by your regular provider.     MORE INFORMATION:    Concussion:  A concussion is a minor head injury that may cause temporary problems with the way the brain works. Although concussions are important, they are generally not an emergency or a reason that a person needs to be hospitalized. Some concussion symptoms include confusion, amnesia (forgetful), nausea (sick to your stomach) and vomiting (throwing up), dizziness, fatigue, memory or concentration problems, irritability and sleep problems. For most people, concussions are mild and temporary but some will have more severe and persistent symptoms that require on-going care and treatment.  CT Scans: Your evaluation today may have included a CT scan (CAT scan) to look for things like bleeding or a skull  fracture (broken bone).  CT scans involve radiation and too many CT scans can cause serious health problems like cancer, especially in children.  Because of this, your provider may not have ordered a CT scan today if they think you are at low risk for a serious or life threatening problem.    If you were given a prescription for medicine here today, be sure to read all of the information (including the package insert) that comes with your prescription.  This will include important information about the medicine, its side effects, and any warnings that you need to know about.  The pharmacist who fills the prescription can provide more information and answer questions you may have about the medicine.  If you have questions or concerns that the pharmacist cannot address, please call or return to the Emergency Department.     Remember that you can always come back to the Emergency Department if you are not able to see your regular provider in the amount of time listed above, if you get any new symptoms, or if there is anything that worries you.     The patient is a 59y Female complaining of wound check.

## 2024-10-08 NOTE — ED PROVIDER NOTE - NSFOLLOWUPINSTRUCTIONS_ED_ALL_ED_FT
Can take tylenol 650mg every 6hrs as needed for pain.    Warm soaks twice a day.    Take antibiotics as prescribed.     Take daily pictures of affected area.    Stay well hydrated.    Return for fevers, persistent vomit, uncontrolled pain, worsening breathing, worsening lightheaded, spreading redness, worsening swelling.    Follow up with primary doctor within 1-2 days.     Cellulitis    Cellulitis is a skin infection caused by bacteria. This condition occurs most often in the arms and lower legs but can occur anywhere over the body. Symptoms include redness, swelling, warm skin, tenderness, and chills/fever. If you were prescribed an antibiotic medicine, take it as told by your health care provider. Do not stop taking the antibiotic even if you start to feel better.    SEEK IMMEDIATE MEDICAL CARE IF YOU HAVE ANY OF THE FOLLOWING SYMPTOMS: worsening fever, red streaks coming from affected area, vomiting or diarrhea, or dizziness/lightheadedness.

## 2024-10-08 NOTE — ED PROVIDER NOTE - CLINICAL SUMMARY MEDICAL DECISION MAKING FREE TEXT BOX
59F PMH HTN, DM p/w pain/swelling. 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 States that over the years, the area intermittently gets inflamed. States that she has increased pain to keloid region for last 7 days and that today while bending over, her keloid "popped" and red fluid oozed out. States she went to her PMD who referred to ED. No other systemic symptoms.   Of note, pt was in ED a few weeks ago for R breast abscess, that has completely resolved.   Vitals wnl, exam as above.   ddx: Likely mild superficial cellulitis. Clinically very unlikely abscess or deeper infection.   Has hx of ?anaphylaxis to PCN, will cover w/ doxy.   Discussed importance of outpt follow up and return precautions. Clinically no indication for further emergent ED workup or hospitalization at this time. Stable for dc, outpt f/u.

## 2024-10-08 NOTE — ED ADULT NURSE NOTE - OBJECTIVE STATEMENT
Patient c/o of chronic wound on navel area states started as a keloid s/p CS years ago, since then has been having intermittent pain and serosanguinous discharge from site.  States was recently seen by PCP, dressing applied, no ABT started.  States mild pain, no fever/chills.

## 2024-10-08 NOTE — ED PROVIDER NOTE - PHYSICAL EXAMINATION
~2-3cm keloid overlying site of umbilicus. punctate skin wound at inferior aspect w/ scant dried blood at center. Keloid is ttp. There is ~7x5cm of surrounding blanching erythema/warmth,   no foul odor or discharge  No crepitus, firmness, induration, fluctuance.   unofficial bedside sono: no fluid collection. no e/o deeper infection. minimal superficial cobblestoning.   rest of abd soft NTND.

## 2024-10-08 NOTE — ED PROVIDER NOTE - OBJECTIVE STATEMENT
59F PMH HTN, DM p/w pain/swelling. 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 States that over the years, the area intermittently gets inflamed. States that she has increased pain to keloid region for last 7 days and that today while bending over, her keloid "popped" and red fluid oozed out. States she went to her PMD who referred to ED. No other systemic symptoms.   Of note, pt was in ED a few weeks ago for R breast abscess, that has completely resolved.   Denies fatigue, lightheaded, f/c, SOB/CP, NVD, other abd pain, urinary complaints.

## 2024-10-08 NOTE — ED PROVIDER NOTE - PATIENT PORTAL LINK FT
You can access the FollowMyHealth Patient Portal offered by [facility] by registering at the following website: [email or web address]. By joining [facility]’s FollowMyHealth portal, you will also be able to view your health information using other applications (apps) compatible with our system.

## 2024-10-10 DIAGNOSIS — I10 ESSENTIAL (PRIMARY) HYPERTENSION: ICD-10-CM

## 2024-10-10 DIAGNOSIS — R21 RASH AND OTHER NONSPECIFIC SKIN ERUPTION: ICD-10-CM

## 2024-10-10 DIAGNOSIS — E11.9 TYPE 2 DIABETES MELLITUS WITHOUT COMPLICATIONS: ICD-10-CM

## 2024-10-10 DIAGNOSIS — Z90.49 ACQUIRED ABSENCE OF OTHER SPECIFIED PARTS OF DIGESTIVE TRACT: ICD-10-CM

## 2024-10-10 DIAGNOSIS — Z88.0 ALLERGY STATUS TO PENICILLIN: ICD-10-CM

## 2024-10-10 DIAGNOSIS — L03.311 CELLULITIS OF ABDOMINAL WALL: ICD-10-CM

## 2025-03-06 NOTE — ED ADULT NURSE NOTE - NSFALLUNIVINTERV_ED_ALL_ED
Constitutional/General: chronically ill appearing, vitals reviewed  EYE: Symmetrical pupils, conjunctiva clear   ENT: Good dentition, oropharynx clear  NECK: No visual masses, no JVD  CHEST: No respiratory distress, bilateral symmetrical chest rise  ABDOMEN: Nondistended, no visual masses  SKIN: No rash, warm, dry  MSK: LUE fistula  NEURO: A+Ox3, Cranial nerves grossly intact, moves all extremities, follows commands  PSYCH: flat affect
Bed/Stretcher in lowest position, wheels locked, appropriate side rails in place/Call bell, personal items and telephone in reach/Instruct patient to call for assistance before getting out of bed/chair/stretcher/Non-slip footwear applied when patient is off stretcher/[place] to call system/Physically safe environment - no spills, clutter or unnecessary equipment/Purposeful proactive rounding/Room/bathroom lighting operational, light cord in reach